# Patient Record
Sex: FEMALE | Race: WHITE | NOT HISPANIC OR LATINO
[De-identification: names, ages, dates, MRNs, and addresses within clinical notes are randomized per-mention and may not be internally consistent; named-entity substitution may affect disease eponyms.]

---

## 2020-01-23 ENCOUNTER — ASOB RESULT (OUTPATIENT)
Age: 20
End: 2020-01-23

## 2020-01-23 ENCOUNTER — APPOINTMENT (OUTPATIENT)
Dept: ANTEPARTUM | Facility: CLINIC | Age: 20
End: 2020-01-23
Payer: MEDICAID

## 2020-01-23 PROCEDURE — 76811 OB US DETAILED SNGL FETUS: CPT

## 2020-01-23 PROCEDURE — 76817 TRANSVAGINAL US OBSTETRIC: CPT

## 2020-06-07 ENCOUNTER — INPATIENT (INPATIENT)
Facility: HOSPITAL | Age: 20
LOS: 2 days | Discharge: HOME | End: 2020-06-10
Attending: OBSTETRICS & GYNECOLOGY | Admitting: OBSTETRICS & GYNECOLOGY
Payer: MEDICAID

## 2020-06-07 VITALS — DIASTOLIC BLOOD PRESSURE: 80 MMHG | SYSTOLIC BLOOD PRESSURE: 133 MMHG | HEART RATE: 109 BPM

## 2020-06-07 LAB
APPEARANCE UR: CLEAR — SIGNIFICANT CHANGE UP
BASOPHILS # BLD AUTO: 0.03 K/UL — SIGNIFICANT CHANGE UP (ref 0–0.2)
BASOPHILS NFR BLD AUTO: 0.2 % — SIGNIFICANT CHANGE UP (ref 0–1)
BILIRUB UR-MCNC: NEGATIVE — SIGNIFICANT CHANGE UP
BLD GP AB SCN SERPL QL: SIGNIFICANT CHANGE UP
COLOR SPEC: SIGNIFICANT CHANGE UP
DIFF PNL FLD: NEGATIVE — SIGNIFICANT CHANGE UP
EOSINOPHIL # BLD AUTO: 0.04 K/UL — SIGNIFICANT CHANGE UP (ref 0–0.7)
EOSINOPHIL NFR BLD AUTO: 0.3 % — SIGNIFICANT CHANGE UP (ref 0–8)
GLUCOSE UR QL: NEGATIVE — SIGNIFICANT CHANGE UP
HCT VFR BLD CALC: 39.2 % — SIGNIFICANT CHANGE UP (ref 37–47)
HGB BLD-MCNC: 13.1 G/DL — SIGNIFICANT CHANGE UP (ref 12–16)
IMM GRANULOCYTES NFR BLD AUTO: 0.7 % — HIGH (ref 0.1–0.3)
KETONES UR-MCNC: NEGATIVE — SIGNIFICANT CHANGE UP
LEUKOCYTE ESTERASE UR-ACNC: NEGATIVE — SIGNIFICANT CHANGE UP
LYMPHOCYTES # BLD AUTO: 18.7 % — LOW (ref 20.5–51.1)
LYMPHOCYTES # BLD AUTO: 2.84 K/UL — SIGNIFICANT CHANGE UP (ref 1.2–3.4)
MCHC RBC-ENTMCNC: 30.2 PG — SIGNIFICANT CHANGE UP (ref 27–31)
MCHC RBC-ENTMCNC: 33.4 G/DL — SIGNIFICANT CHANGE UP (ref 32–37)
MCV RBC AUTO: 90.3 FL — SIGNIFICANT CHANGE UP (ref 81–99)
MONOCYTES # BLD AUTO: 0.88 K/UL — HIGH (ref 0.1–0.6)
MONOCYTES NFR BLD AUTO: 5.8 % — SIGNIFICANT CHANGE UP (ref 1.7–9.3)
NEUTROPHILS # BLD AUTO: 11.3 K/UL — HIGH (ref 1.4–6.5)
NEUTROPHILS NFR BLD AUTO: 74.3 % — SIGNIFICANT CHANGE UP (ref 42.2–75.2)
NITRITE UR-MCNC: NEGATIVE — SIGNIFICANT CHANGE UP
NRBC # BLD: 0 /100 WBCS — SIGNIFICANT CHANGE UP (ref 0–0)
PH UR: 7 — SIGNIFICANT CHANGE UP (ref 5–8)
PLATELET # BLD AUTO: 216 K/UL — SIGNIFICANT CHANGE UP (ref 130–400)
PRENATAL SYPHILIS TEST: SIGNIFICANT CHANGE UP
PROT UR-MCNC: NEGATIVE — SIGNIFICANT CHANGE UP
RBC # BLD: 4.34 M/UL — SIGNIFICANT CHANGE UP (ref 4.2–5.4)
RBC # FLD: 13.3 % — SIGNIFICANT CHANGE UP (ref 11.5–14.5)
SARS-COV-2 RNA SPEC QL NAA+PROBE: SIGNIFICANT CHANGE UP
SP GR SPEC: 1.01 — SIGNIFICANT CHANGE UP (ref 1.01–1.02)
UROBILINOGEN FLD QL: SIGNIFICANT CHANGE UP
WBC # BLD: 15.19 K/UL — HIGH (ref 4.8–10.8)
WBC # FLD AUTO: 15.19 K/UL — HIGH (ref 4.8–10.8)

## 2020-06-07 RX ORDER — DINOPROSTONE 10 MG/241MG
10 INSERT VAGINAL ONCE
Refills: 0 | Status: COMPLETED | OUTPATIENT
Start: 2020-06-07 | End: 2020-06-07

## 2020-06-07 RX ORDER — OXYTOCIN 10 UNIT/ML
41.67 VIAL (ML) INJECTION
Qty: 20 | Refills: 0 | Status: DISCONTINUED | OUTPATIENT
Start: 2020-06-07 | End: 2020-06-10

## 2020-06-07 RX ADMIN — DINOPROSTONE 10 MILLIGRAM(S): 10 INSERT VAGINAL at 21:30

## 2020-06-07 NOTE — OB PROVIDER H&P - NSHPPHYSICALEXAM_GEN_ALL_CORE
Vital Signs Last 24 Hrs  T(F): 97.5 (07 Jun 2020 20:13), Max: 97.52 (07 Jun 2020 20:12)  HR: 109 (07 Jun 2020 20:13) (109 - 109)  BP: 133/80 (07 Jun 2020 20:13) (133/80 - 133/80)  RR: 18 (07 Jun 2020 20:13) (18 - 18)    Physical Exam:  GA: AOX3, no apparent distress  Resp: CTAB  Cardio: RRR  Abd: soft, nontender, no palpable ctx, gravid  Extr: no erythema or pain to palpation bilaterally   SVE: 1/50/-3, vtx, intact     EFM: 130bpm/moderate variability/+accels   [employer]: irregular

## 2020-06-07 NOTE — OB PROVIDER H&P - ASSESSMENT
A/P: 19yo  at 41w6d GA, Rh pos, GBS negative; IOL for post-EDC   -admit to labor and delivery  -pain management prn   -continous efm & toco  -admission labs  -IV hydration   -induction: cervidil       Dr. Pretty & Dr. Garnica aware

## 2020-06-07 NOTE — OB PROVIDER H&P - HISTORY OF PRESENT ILLNESS
19yo  at 41w6d GA dated by LMP presents to L&D for IOL for post-dates. Reports good fetal movement. Denies VB, LOF, or contractions. Denies complications in this pregnancy. GBS negative

## 2020-06-07 NOTE — OB PROVIDER H&P - NSHPLABSRESULTS_GEN_ALL_CORE
Labs:  HbSAg: neg  HIV: neg   RPR: neg   Blood Type: AB pos   Antibody Screen: neg  Rubella: immune  Varicella: immune  Measles: immune  GCT: 75  GBS: neg       Sonos:  @23w0d: AGA, 17%, nml anatomy, nl marychuy, 3 VC, posterior placenta

## 2020-06-08 LAB
AMPHET UR-MCNC: NEGATIVE — SIGNIFICANT CHANGE UP
BARBITURATES UR SCN-MCNC: NEGATIVE — SIGNIFICANT CHANGE UP
BENZODIAZ UR-MCNC: NEGATIVE — SIGNIFICANT CHANGE UP
BUPRENORPHINE SCREEN, URINE RESULT: NEGATIVE — SIGNIFICANT CHANGE UP
COCAINE METAB.OTHER UR-MCNC: NEGATIVE — SIGNIFICANT CHANGE UP
FENTANYL UR QL: NEGATIVE — SIGNIFICANT CHANGE UP
L&D DRUG SCREEN, URINE: SIGNIFICANT CHANGE UP
METHADONE UR-MCNC: NEGATIVE — SIGNIFICANT CHANGE UP
OPIATES UR-MCNC: NEGATIVE — SIGNIFICANT CHANGE UP
OXYCODONE UR-MCNC: NEGATIVE — SIGNIFICANT CHANGE UP
PCP UR-MCNC: NEGATIVE — SIGNIFICANT CHANGE UP
PROPOXYPHENE QUALITATIVE URINE RESULT: NEGATIVE — SIGNIFICANT CHANGE UP

## 2020-06-08 PROCEDURE — 59400 OBSTETRICAL CARE: CPT | Mod: U9

## 2020-06-08 RX ORDER — KETOROLAC TROMETHAMINE 30 MG/ML
30 SYRINGE (ML) INJECTION ONCE
Refills: 0 | Status: DISCONTINUED | OUTPATIENT
Start: 2020-06-08 | End: 2020-06-08

## 2020-06-08 RX ORDER — AER TRAVELER 0.5 G/1
1 SOLUTION RECTAL; TOPICAL EVERY 4 HOURS
Refills: 0 | Status: DISCONTINUED | OUTPATIENT
Start: 2020-06-08 | End: 2020-06-10

## 2020-06-08 RX ORDER — MAGNESIUM HYDROXIDE 400 MG/1
30 TABLET, CHEWABLE ORAL
Refills: 0 | Status: DISCONTINUED | OUTPATIENT
Start: 2020-06-08 | End: 2020-06-10

## 2020-06-08 RX ORDER — IBUPROFEN 200 MG
600 TABLET ORAL EVERY 6 HOURS
Refills: 0 | Status: COMPLETED | OUTPATIENT
Start: 2020-06-08 | End: 2021-05-07

## 2020-06-08 RX ORDER — ONDANSETRON 8 MG/1
4 TABLET, FILM COATED ORAL EVERY 6 HOURS
Refills: 0 | Status: DISCONTINUED | OUTPATIENT
Start: 2020-06-08 | End: 2020-06-10

## 2020-06-08 RX ORDER — PRAMOXINE HYDROCHLORIDE 150 MG/15G
1 AEROSOL, FOAM RECTAL EVERY 4 HOURS
Refills: 0 | Status: DISCONTINUED | OUTPATIENT
Start: 2020-06-08 | End: 2020-06-10

## 2020-06-08 RX ORDER — OXYTOCIN 10 UNIT/ML
2 VIAL (ML) INJECTION
Qty: 30 | Refills: 0 | Status: DISCONTINUED | OUTPATIENT
Start: 2020-06-08 | End: 2020-06-08

## 2020-06-08 RX ORDER — ACETAMINOPHEN 500 MG
975 TABLET ORAL
Refills: 0 | Status: DISCONTINUED | OUTPATIENT
Start: 2020-06-08 | End: 2020-06-10

## 2020-06-08 RX ORDER — OXYCODONE HYDROCHLORIDE 5 MG/1
5 TABLET ORAL
Refills: 0 | Status: DISCONTINUED | OUTPATIENT
Start: 2020-06-08 | End: 2020-06-10

## 2020-06-08 RX ORDER — DIBUCAINE 1 %
1 OINTMENT (GRAM) RECTAL EVERY 6 HOURS
Refills: 0 | Status: DISCONTINUED | OUTPATIENT
Start: 2020-06-08 | End: 2020-06-10

## 2020-06-08 RX ORDER — SODIUM CHLORIDE 9 MG/ML
3 INJECTION INTRAMUSCULAR; INTRAVENOUS; SUBCUTANEOUS EVERY 8 HOURS
Refills: 0 | Status: DISCONTINUED | OUTPATIENT
Start: 2020-06-08 | End: 2020-06-09

## 2020-06-08 RX ORDER — IBUPROFEN 200 MG
600 TABLET ORAL EVERY 6 HOURS
Refills: 0 | Status: DISCONTINUED | OUTPATIENT
Start: 2020-06-08 | End: 2020-06-10

## 2020-06-08 RX ORDER — SENNA PLUS 8.6 MG/1
2 TABLET ORAL AT BEDTIME
Refills: 0 | Status: DISCONTINUED | OUTPATIENT
Start: 2020-06-08 | End: 2020-06-10

## 2020-06-08 RX ORDER — NALOXONE HYDROCHLORIDE 4 MG/.1ML
0.1 SPRAY NASAL
Refills: 0 | Status: DISCONTINUED | OUTPATIENT
Start: 2020-06-08 | End: 2020-06-10

## 2020-06-08 RX ORDER — LANOLIN
1 OINTMENT (GRAM) TOPICAL EVERY 6 HOURS
Refills: 0 | Status: DISCONTINUED | OUTPATIENT
Start: 2020-06-08 | End: 2020-06-10

## 2020-06-08 RX ORDER — DEXAMETHASONE 0.5 MG/5ML
4 ELIXIR ORAL EVERY 6 HOURS
Refills: 0 | Status: DISCONTINUED | OUTPATIENT
Start: 2020-06-08 | End: 2020-06-10

## 2020-06-08 RX ORDER — DIPHENHYDRAMINE HCL 50 MG
25 CAPSULE ORAL EVERY 6 HOURS
Refills: 0 | Status: DISCONTINUED | OUTPATIENT
Start: 2020-06-08 | End: 2020-06-10

## 2020-06-08 RX ORDER — BUTORPHANOL TARTRATE 2 MG/ML
2 INJECTION, SOLUTION INTRAMUSCULAR; INTRAVENOUS ONCE
Refills: 0 | Status: DISCONTINUED | OUTPATIENT
Start: 2020-06-08 | End: 2020-06-08

## 2020-06-08 RX ORDER — SIMETHICONE 80 MG/1
80 TABLET, CHEWABLE ORAL EVERY 4 HOURS
Refills: 0 | Status: DISCONTINUED | OUTPATIENT
Start: 2020-06-08 | End: 2020-06-10

## 2020-06-08 RX ORDER — BENZOCAINE 10 %
1 GEL (GRAM) MUCOUS MEMBRANE EVERY 6 HOURS
Refills: 0 | Status: DISCONTINUED | OUTPATIENT
Start: 2020-06-08 | End: 2020-06-10

## 2020-06-08 RX ORDER — BUPIVACAINE HCL/PF 7.5 MG/ML
250 VIAL (ML) INJECTION
Refills: 0 | Status: DISCONTINUED | OUTPATIENT
Start: 2020-06-08 | End: 2020-06-10

## 2020-06-08 RX ORDER — ONDANSETRON 8 MG/1
4 TABLET, FILM COATED ORAL ONCE
Refills: 0 | Status: COMPLETED | OUTPATIENT
Start: 2020-06-08 | End: 2020-06-08

## 2020-06-08 RX ORDER — HYDROCORTISONE 1 %
1 OINTMENT (GRAM) TOPICAL EVERY 6 HOURS
Refills: 0 | Status: DISCONTINUED | OUTPATIENT
Start: 2020-06-08 | End: 2020-06-10

## 2020-06-08 RX ORDER — OXYCODONE HYDROCHLORIDE 5 MG/1
5 TABLET ORAL ONCE
Refills: 0 | Status: DISCONTINUED | OUTPATIENT
Start: 2020-06-08 | End: 2020-06-10

## 2020-06-08 RX ADMIN — Medication 2 MILLIUNIT(S)/MIN: at 03:54

## 2020-06-08 RX ADMIN — Medication 1 SPRAY(S): at 22:35

## 2020-06-08 RX ADMIN — Medication 600 MILLIGRAM(S): at 18:02

## 2020-06-08 RX ADMIN — BUTORPHANOL TARTRATE 2 MILLIGRAM(S): 2 INJECTION, SOLUTION INTRAMUSCULAR; INTRAVENOUS at 01:12

## 2020-06-08 RX ADMIN — SENNA PLUS 2 TABLET(S): 8.6 TABLET ORAL at 22:33

## 2020-06-08 RX ADMIN — Medication 975 MILLIGRAM(S): at 22:15

## 2020-06-08 RX ADMIN — ONDANSETRON 4 MILLIGRAM(S): 8 TABLET, FILM COATED ORAL at 01:12

## 2020-06-08 RX ADMIN — SODIUM CHLORIDE 3 MILLILITER(S): 9 INJECTION INTRAMUSCULAR; INTRAVENOUS; SUBCUTANEOUS at 16:22

## 2020-06-08 RX ADMIN — Medication 975 MILLIGRAM(S): at 16:58

## 2020-06-08 RX ADMIN — AER TRAVELER 1 APPLICATION(S): 0.5 SOLUTION RECTAL; TOPICAL at 17:01

## 2020-06-08 RX ADMIN — Medication 30 MILLIGRAM(S): at 13:21

## 2020-06-08 NOTE — OB PROVIDER DELIVERY SUMMARY - NSPROVIDERDELIVERYNOTE_OBGYN_ALL_OB_FT
Patient was fully dilated and pushing. Fetal head was OA and restituted to ROT. The anterior and posterior shoulders delivered, followed by the remaining body atraumatically. Delayed cord clamping was performed, and then clamped and cut. Cord blood gases collected x2. The  was handed to the mother. The placenta delivered intact with membranes. Pitocin was administered. Uterus massaged, fundus found to be firm. Cervix, vagina and perineum inspected. Second degree laceration was noted, repaired using 2-0 chromic in the usual fashion with good hemostasis.     Viable female infant delivered,  with APGARs 9/9    Lacteration: 2nd degree laceration  EBL 300cc

## 2020-06-08 NOTE — PROGRESS NOTE ADULT - ASSESSMENT
A/P:  21yo  at 42w0d GA, Rh pos, GBS negative, s/p cervidil, IOL for post-EDC, s/p epidural, s/p AROM clear  -fully dilated and pushing  -cont EFM/toco  -clear liquid diet, IVF  -pain management with epidural  -f/u UDS    Dr. Garnica and Dr. Bronson aware.

## 2020-06-08 NOTE — PROGRESS NOTE ADULT - SUBJECTIVE AND OBJECTIVE BOX
PGY 1 Note    Patient seen at bedside for evaluation of labor progression. Comfortable s/p epidural.  Instructed on pushing and currently pushing. S/p AROM clear @0648.     Vital Signs Last 24 Hrs  T(C): 36.6 (2020 04:46), Max: 36.6 (2020 01:13)  T(F): 97.88 (2020 01:13), Max: 97.88 (2020 01:13)  HR: 87 (2020 10:51) (71 - 127)  BP: 108/60 (2020 10:51) (98/57 - 150/83) first elevated  0100  RR: 20 (2020 04:46) (18 - 20)      EFM: 140/mod/no accel/intermittent decels with contractions  TOCO: q2-3m  SVE: 10/100/+1 @0942    Labs:                        13.1   15.19 )-----------( 216      ( 2020 21:09 )             39.2           ABO RH Interpretation: AB POS (20 @ 22:40)  antibody neg  COVID neg    Urinalysis Basic - ( 2020 21:09 )    Color: Light Yellow / Appearance: Clear / S.014 / pH: x  Gluc: x / Ketone: Negative  / Bili: Negative / Urobili: <2 mg/dL   Blood: x / Protein: Negative / Nitrite: Negative   Leuk Esterase: Negative / RBC: x / WBC x   Sq Epi: x / Non Sq Epi: x / Bacteria: x      Meds: BUpivacaine 0.1% in 0.9% Sodium Chloride PCEA 250 milliLiter(s) Epidural PCA Continuous, started @0530  oxytocin Infusion 2 milliUNIT(s)/Min IV Continuous <Continuous>, currently off  butorphanol Injectable 2 milliGRAM(s) IV Push once  dinoprostone Insert 10 milliGRAM(s) Vaginal once  ondansetron Injectable 4 milliGRAM(s) IV Push once

## 2020-06-08 NOTE — PROGRESS NOTE ADULT - SUBJECTIVE AND OBJECTIVE BOX
PGY1 Note    Patient seen at bedside for evaluation of contraction pain and complaints of LOF. She would like stadol for pain management at this time.     T(F): 97.5 (20:13)  HR: 82 (00:30)  BP: 150/83 (01:00)  RR: 18 (20:13)    EFM: 135/mod armen/+accels  TOCO: q1-2 mins  SVE: 1/50/-3, vtx, intact    Medications:  dinoprostone Insert: 10 milliGRAM(s) (20 @ 21:30)      Labs:                        13.1   15.19 )-----------( 216      ( 2020 21:09 )             39.2           ABO RH Interpretation: AB POS (20 @ 22:40)    Antibody Screen: NEG (20 @ 22:40)    Urinalysis Basic - ( 2020 21:09 )    Color: Light Yellow / Appearance: Clear / S.014 / pH: x  Gluc: x / Ketone: Negative  / Bili: Negative / Urobili: <2 mg/dL   Blood: x / Protein: Negative / Nitrite: Negative   Leuk Esterase: Negative / RBC: x / WBC x   Sq Epi: x / Non Sq Epi: x / Bacteria: x        Prenatal Syphilis Test: Nonreact (20 @ 21:09)                A/P: 21yo  at 42w0d GA, Rh pos, GBS negative; IOL for post-EDC, with cervidil   -continue cervidil until 930  -pain management prn - Stadol and Zofran ordered  -cont efm/toco  -f/u pending labs (UDS)  -cont to monitor vitals  -cont iv hydration    Dr. Pretty and Dr. Garnica aware

## 2020-06-08 NOTE — PROGRESS NOTE ADULT - SUBJECTIVE AND OBJECTIVE BOX
PGY1 Note    Patient seen at bedside after cervidil fell out. Pt reports no acute complaints.    T(F): 97.88 ( @ 01:13), Max: 97.88 ( @ 01:13)  HR: 89 ( @ 02:00)  BP: 136/69 ( @ 02:00) (115/67 - 150/83)  RR: 20 ( @ 01:13)    EFM: 120bpm/moderate variability/+accels  TOCO: q2-4mins  SVE: 1-2/70/-3    Medications:  butorphanol Injectable: 2 ( @ 01:12)  dinoprostone Insert: 10 ( @ 21:30)  ondansetron Injectable: 4 ( @ 01:12)      Labs:                        13.1   15.19 )-----------( 216      ( 2020 21:09 )             39.2           Prenatal Syphilis Test: Nonreact ( @ 21:09)  Antibody Screen: NEG (20 @ 22:40)    Urinalysis Basic - ( 2020 21:09 )    Color: Light Yellow / Appearance: Clear / S.014 / pH: x  Gluc: x / Ketone: Negative  / Bili: Negative / Urobili: <2 mg/dL   Blood: x / Protein: Negative / Nitrite: Negative   Leuk Esterase: Negative / RBC: x / WBC x   Sq Epi: x / Non Sq Epi: x / Bacteria: x

## 2020-06-08 NOTE — PROGRESS NOTE ADULT - ASSESSMENT
A/P: 19yo  at 42w0d GA, Rh pos, GBS negative; s/p cervidil, IOL for post-EDC  -re-examine in 2 hours to evaluate for cervical change   -pain management prn  -cont efm/toco  -f/u pending labs (UDS)  -covid-19 neg   -cont to monitor vitals  -cont iv hydration  -diet: CLD     Dr. Pretty and Dr. Garnica aware

## 2020-06-09 LAB
BASOPHILS # BLD AUTO: 0.04 K/UL — SIGNIFICANT CHANGE UP (ref 0–0.2)
BASOPHILS NFR BLD AUTO: 0.3 % — SIGNIFICANT CHANGE UP (ref 0–1)
EOSINOPHIL # BLD AUTO: 0.11 K/UL — SIGNIFICANT CHANGE UP (ref 0–0.7)
EOSINOPHIL NFR BLD AUTO: 0.8 % — SIGNIFICANT CHANGE UP (ref 0–8)
HCT VFR BLD CALC: 28.7 % — LOW (ref 37–47)
HGB BLD-MCNC: 9.8 G/DL — LOW (ref 12–16)
IMM GRANULOCYTES NFR BLD AUTO: 0.6 % — HIGH (ref 0.1–0.3)
LYMPHOCYTES # BLD AUTO: 19.4 % — LOW (ref 20.5–51.1)
LYMPHOCYTES # BLD AUTO: 2.8 K/UL — SIGNIFICANT CHANGE UP (ref 1.2–3.4)
MCHC RBC-ENTMCNC: 31.1 PG — HIGH (ref 27–31)
MCHC RBC-ENTMCNC: 34.1 G/DL — SIGNIFICANT CHANGE UP (ref 32–37)
MCV RBC AUTO: 91.1 FL — SIGNIFICANT CHANGE UP (ref 81–99)
MONOCYTES # BLD AUTO: 0.86 K/UL — HIGH (ref 0.1–0.6)
MONOCYTES NFR BLD AUTO: 6 % — SIGNIFICANT CHANGE UP (ref 1.7–9.3)
NEUTROPHILS # BLD AUTO: 10.53 K/UL — HIGH (ref 1.4–6.5)
NEUTROPHILS NFR BLD AUTO: 72.9 % — SIGNIFICANT CHANGE UP (ref 42.2–75.2)
NRBC # BLD: 0 /100 WBCS — SIGNIFICANT CHANGE UP (ref 0–0)
PLATELET # BLD AUTO: 179 K/UL — SIGNIFICANT CHANGE UP (ref 130–400)
RBC # BLD: 3.15 M/UL — LOW (ref 4.2–5.4)
RBC # FLD: 13.6 % — SIGNIFICANT CHANGE UP (ref 11.5–14.5)
SARS-COV-2 IGG SERPL QL IA: NEGATIVE — SIGNIFICANT CHANGE UP
SARS-COV-2 IGM SERPL IA-ACNC: <0.1 INDEX — SIGNIFICANT CHANGE UP
WBC # BLD: 14.43 K/UL — HIGH (ref 4.8–10.8)
WBC # FLD AUTO: 14.43 K/UL — HIGH (ref 4.8–10.8)

## 2020-06-09 RX ADMIN — Medication 975 MILLIGRAM(S): at 22:48

## 2020-06-09 RX ADMIN — Medication 600 MILLIGRAM(S): at 00:48

## 2020-06-09 RX ADMIN — Medication 975 MILLIGRAM(S): at 08:52

## 2020-06-09 RX ADMIN — Medication 975 MILLIGRAM(S): at 03:01

## 2020-06-09 RX ADMIN — Medication 600 MILLIGRAM(S): at 11:58

## 2020-06-09 RX ADMIN — Medication 600 MILLIGRAM(S): at 06:43

## 2020-06-09 RX ADMIN — SENNA PLUS 2 TABLET(S): 8.6 TABLET ORAL at 21:45

## 2020-06-09 NOTE — PROGRESS NOTE ADULT - SUBJECTIVE AND OBJECTIVE BOX
OB attending  PPD #1    Pt doing well, pain well controlled. No overnight events, no acute complaints.    Ambulating: Yes  Voiding: Yes  Flatus: Yes  Bowel movements: Yes   Breast or bottle feeding: Breastfeeding  Diet: Regular    PAST MEDICAL & SURGICAL HISTORY:  No pertinent past medical history  No significant past surgical history      Physical Exam  Vital Signs Last 24 Hrs  T(C): 35.8 (2020 07:44), Max: 36.8 (2020 16:17)  T(F): 96.4 (2020 07:44), Max: 98.3 (2020 16:17)  HR: 76 (2020 07:44) (75 - 98)  BP: 115/57 (2020 07:44) (111/56 - 128/61)  BP(mean): --  RR: 18 (2020 07:44) (18 - 18)  SpO2: --  Gen: AAOx3, NAD  Abd: Soft, nontender, nondistended, BS+  Fundus: Firm, below umbilicus  Lochia: normal  Ext: No calf tenderness, no swelling    Labs:                        9.8    14.43 )-----------( 179      ( 2020 06:28 )             28.7     rh+    A/P:  s/p , PPD #1, doing well  - continue current management  - d/c home in [place]

## 2020-06-10 ENCOUNTER — TRANSCRIPTION ENCOUNTER (OUTPATIENT)
Age: 20
End: 2020-06-10

## 2020-06-10 VITALS
RESPIRATION RATE: 18 BRPM | SYSTOLIC BLOOD PRESSURE: 115 MMHG | HEART RATE: 80 BPM | TEMPERATURE: 97 F | DIASTOLIC BLOOD PRESSURE: 58 MMHG

## 2020-06-10 RX ORDER — IBUPROFEN 200 MG
1 TABLET ORAL
Qty: 120 | Refills: 0
Start: 2020-06-10 | End: 2020-07-09

## 2020-06-10 RX ORDER — SIMETHICONE 80 MG/1
1 TABLET, CHEWABLE ORAL
Qty: 0 | Refills: 0 | DISCHARGE
Start: 2020-06-10

## 2020-06-10 RX ORDER — ACETAMINOPHEN 500 MG
2 TABLET ORAL
Qty: 0 | Refills: 0 | DISCHARGE
Start: 2020-06-10

## 2020-06-10 RX ORDER — DOCUSATE SODIUM 100 MG
1 CAPSULE ORAL
Qty: 20 | Refills: 0
Start: 2020-06-10 | End: 2020-06-19

## 2020-06-10 RX ORDER — ACETAMINOPHEN 500 MG
3 TABLET ORAL
Qty: 0 | Refills: 0 | DISCHARGE
Start: 2020-06-10

## 2020-06-10 RX ORDER — IBUPROFEN 200 MG
1 TABLET ORAL
Qty: 0 | Refills: 0 | DISCHARGE
Start: 2020-06-10

## 2020-06-10 RX ADMIN — Medication 975 MILLIGRAM(S): at 08:35

## 2020-06-10 RX ADMIN — Medication 975 MILLIGRAM(S): at 14:47

## 2020-06-10 RX ADMIN — Medication 600 MILLIGRAM(S): at 06:04

## 2020-06-10 RX ADMIN — Medication 975 MILLIGRAM(S): at 02:58

## 2020-06-10 RX ADMIN — Medication 600 MILLIGRAM(S): at 12:34

## 2020-06-10 NOTE — PROGRESS NOTE ADULT - SUBJECTIVE AND OBJECTIVE BOX
OB attending  PPD #2    Pt doing well, pain well controlled. No overnight events, no acute complaints.    Ambulating: Yes  Voiding: Yes  Flatus: Yes  Bowel movements: Yes   Breast or bottle feeding: Breastfeeding  Diet: Regular    PAST MEDICAL & SURGICAL HISTORY:  No pertinent past medical history  No significant past surgical history      Physical Exam  Vital Signs Last 24 Hrs  T(C): 36.1 (10 Marcel 2020 07:24), Max: 36.1 (10 Marcel 2020 07:24)  T(F): 97 (10 Marcel 2020 07:24), Max: 97 (10 Marcel 2020 07:24)  HR: 80 (10 Marcel 2020 07:24) (73 - 80)  BP: 115/58 (10 Marcel 2020 07:24) (113/62 - 119/63)  BP(mean): --  RR: 18 (10 Marcel 2020 07:24) (18 - 18)  SpO2: --  Gen: AAOx3, NAD  Abd: Soft, nontender, nondistended, BS+  Fundus: Firm, below umbilicus  Lochia: normal  Ext: No calf tenderness, no swelling    Labs:                        9.8    14.43 )-----------( 179      ( 2020 06:28 )             28.7         A/P:  s/p , PPD #1, doing well  - continue current management  d/c home

## 2020-06-10 NOTE — DISCHARGE NOTE OB - PATIENT PORTAL LINK FT
You can access the FollowMyHealth Patient Portal offered by [facility] by registering at the following website: [email or web address]. By joining [facility] FollowMyHealth portal, you will also be able to view your health information using other applications (apps) compatible with our system.

## 2020-06-10 NOTE — DISCHARGE NOTE OB - PHYSICIAN SECTION COMPLETE
consult dict awake alert speech fluent numbness arm leg resolved arm leg 4/5  tia for work up non focla exam Yes

## 2020-06-10 NOTE — DISCHARGE NOTE OB - CARE PROVIDER_API CALL
Jhonny Diaz  OBSTETRICS AND GYNECOLOGY  [address] [place] [address]  Phone: [phone]  Fax: [phone]  Follow Up Time:

## 2020-06-12 DIAGNOSIS — Z3A.41 41 WEEKS GESTATION OF PREGNANCY: ICD-10-CM

## 2020-06-12 DIAGNOSIS — Z34.80 ENCOUNTER FOR SUPERVISION OF OTHER NORMAL PREGNANCY, UNSPECIFIED TRIMESTER: ICD-10-CM

## 2020-06-12 DIAGNOSIS — O48.0 POST-TERM PREGNANCY: ICD-10-CM

## 2020-07-06 NOTE — OB PROVIDER H&P - NSPRENATALGBS_OBGYN_ALL_OB_GET_DAYS
[address] Neurology A department of [place] [address]  Phone: [phone]  Fax: [phone]    Zuleika Chakraborty MD        July 6, 2020     Patient: Jorje Dumont   YOB: 1981   Date of Visit: 7/6/2020       To Whom it May Concern:    Abdi Sharif was seen in my clinic on 7/6/2020. She may return to work on 07/06/2020. If you have any questions or concerns, please don't hesitate to call.     Sincerely,         Zuleika Chakraborty MD [phone]

## 2020-07-14 ENCOUNTER — FORM ENCOUNTER (OUTPATIENT)
Age: 20
End: 2020-07-14

## 2020-09-05 NOTE — OB PROVIDER IHI INDUCTION/AUGMENTATION NOTE - NS_FINALEDD_OBGYN_ALL_OB_DT
Progress Note - Sonam Koenig 5/36/2061, 76 y o  female MRN: [id number]    Unit/Bed#: [facility] 525-01 Encounter: [id number]    Primary Care Provider: Ivory Gabriel DO   Date and time admitted to hospital: 8/26/2020  4:18 PM        Coronary artery disease  Assessment & Plan  S/p PCI July 2019  C/w Plavix and BB and statin    LLQ pain  Assessment & Plan  c/o severe pain  CTAP showed no acute finding  Incidentally shows transverse colon nodule  Can get repeat CTAP in 3 months if desired by guardian   Suspect musculoskeletal pain    Herpes zoster without complication  Assessment & Plan  In lumbar region  Lesions appear crusted - no need for contact precautions  Completed Valtrex for 7 days    CKD (chronic kidney disease) stage 3, GFR 30-59 ml/min ([facility])  Assessment & Plan  Estimated Creatinine Clearance: 50 3 mL/min (by C-G formula based on SCr of 0 96 mg/dL)  B/l Cr 1 1  See MALA    DM2 (diabetes mellitus, type 2) [facility])  Assessment & Plan  Lab Results   Component Value Date    HGBA1C 7 6 (H) 08/05/2020       Recent Labs     09/04/20  1616 09/04/20 2052 09/05/20  0647 09/05/20  1044   POCGLU 185* 163* 134 132       Blood Sugar Average: Last 72 hrs:  (P) 147 [id number]   SSI  Blood Glucose checks TIDWM and QHS  Hold oral medications  ISS      Pain of cervical spine  Assessment & Plan  Scheduled tylenol  Neurontin changed to qhs  Oxy prn  MRI shows severe stenosis  Pt was supposed to see nsurg OP on 8/24 but did not show up to appt  Six vw Cervical XR today shows multilevel degenerative disc disease status post fusion with stable grade 1-2 anterolisthesis of C3-4  Neurosurg consult appreciated - no plan for surgery at this time  Palliative consult for pain control appreciated - start on scheduled oxy - increased to 7 5mg q6 by palliative care on 9/4  prob has some pain at her zoster healing site as well   Will increase neurontin from 200 HS to add 100 mg QAM & 200 qPM on 9/5  Add ambien for sleep    A-fib [facility])  Assessment & Plan  Patient rate controlled on admission; continue BB  Anticoagulated with Eliquis 5 mg b i d  Anemia  Assessment & Plan  · Hb dropped from 11-12 to 9  · Check stool occult, iron panel  · Patients states she has intermittent black stools  · HB this morning appears stable at 9 5 from 9 0  · Continue eliquis  · CT AP had no evidence of bleed    Elevated troponin level not due myocardial infarction  Assessment & Plan  Patient with elevated troponin; denies chest pain  EKG in the ED negative for ischemia; shows rate controlled atrial fibrillation  Trop actually lower than earlier in month  No need for further w/u    Mild cognitive impairment  Assessment & Plan  Neurospsych eval pt 8/28 and deemed her incompetent to make medical decisions    Essential hypertension  Assessment & Plan  C/w BB  Hold ACEi in setting of MALA    Chronic systolic congestive heart failure (HCC)  Assessment & Plan  Wt Readings from Last 3 Encounters:   09/05/20 82 3 kg (181 lb 7 oz)   08/09/20 75 4 kg (166 lb 3 6 oz)   08/06/20 86 4 kg (190 lb 7 6 oz)     Echo with EF 45%; continue home medications  Patient reports dyspnea on exertion; became winded talking to examiner  BNP elevated at 1326; distended abdomen  Denies night cough, orthopnea, proximal nocturnal dyspnea; a chest x-ray read as normal, but may have infiltrates  at time of exam on 8/27 appears euvolemic  However, possible MYERS 2/2 deconditioning as pt appears dry on exam  gentle IVF to tx MALA & it improved  Acute CHF exacerbation is ruled out  Per renal hold ACEi at d/c  Can d/c on Lasix 20 mg PO daily  Can have BMP at rehab and if Cr stable, can be placed back on ACEi  Since she will be here pending placement, restarted her on Lasix 20 mg daily      Weakness/physical deconditioning  Assessment & Plan  PT/OT  Recent admission or patient declined follow on rehabilitation  Neuropsych eval ordered and pt not competent to make medical decisions    Will need guardian and then rehab placement    Dysthymic disorder  Assessment & Plan  Patient tearful and depressed on examination in ER; denies SI/HI, but did earlier in the ED  Continue Prozac and Pamelor  Behavior Health consult appreciated - no 1 to 1 needed        [facility] Internal Medicine Progress Note  Patient: Luigi Rush 76 y o  female   MRN: [id number]  PCP: Brennan Badillo DO  Unit/Bed#: [facility] 525-01 Encounter: [id number]  Date Of Visit: 20    Assessment:    Principal Problem:    MALA (acute kidney injury) ([facility] )  Active Problems:    Coronary artery disease    Dysthymic disorder    Weakness/physical deconditioning    Chronic systolic congestive heart failure ([facility] )    Essential hypertension    Hyperlipidemia    Mild cognitive impairment    Elevated troponin level not due myocardial infarction    Anemia    A-fib ([facility])    Pain of cervical spine    DM2 (diabetes mellitus, type 2) ([facility])    CKD (chronic kidney disease) stage 3, GFR 30-59 ml/min ([facility])    Herpes zoster without complication    LLQ pain      Plan:       VTE Pharmacologic Prophylaxis:   Pharmacologic: Apixaban (Eliquis)  Mechanical VTE Prophylaxis in Place: Yes    Patient Centered Rounds: I have performed bedside rounds with nursing staff today  Discussions with Specialists or Other Care Team Provider:     Education and Discussions with Family / Patient: ptient    Time Spent for Care: 30 minutes  More than 50% of total time spent on counseling and coordination of care as described above      Current Length of Stay: 10 day(s)    Current Patient Status: Inpatient   Certification Statement: The patient will continue to require additional inpatient hospital stay due to placement pending    Discharge Plan / Estimated Discharge Date:     Code Status: Level 3 - DNAR and DNI      Subjective:   Having pain, missing her granddaughter    Objective:     Vitals:   Temp (24hrs), Av 8 °F (36 6 °C), Min:97 5 °F (36 4 °C), Max:98 1 °F (36 7 °C)    Temp:  [97 5 °F (36 4 °C)-98 1 °F (36 7 °C)] 97 7 °F (36 5 °C)  HR:  [60-90] 60  Resp:  [16-18] 18  BP: (109-141)/(57-76) 109/63  SpO2:  [95 %-98 %] 95 %  Body mass index is 30 19 kg/m²  Input and Output Summary (last 24 hours): Intake/Output Summary (Last 24 hours) at 9/5/2020 1706  Last data filed at 9/5/2020 1654  Gross per 24 hour   Intake 768 ml   Output 700 ml   Net 68 ml       Physical Exam:     Physical Exam  Vitals signs reviewed  HENT:      Head: Normocephalic and atraumatic  Cardiovascular:      Rate and Rhythm: Normal rate and regular rhythm  Heart sounds: Normal heart sounds  No murmur  No gallop  Pulmonary:      Effort: Pulmonary effort is normal  No respiratory distress  Breath sounds: Normal breath sounds  No wheezing  Abdominal:      Palpations: Abdomen is soft  Neurological:      Mental Status: She is alert  Additional Data:     Labs:    Results from last 7 days   Lab Units 09/04/20  0440   WBC Thousand/uL 7 47   HEMOGLOBIN g/dL 8 7*   HEMATOCRIT % 28 1*   PLATELETS Thousands/uL 233   NEUTROS PCT % 75   LYMPHS PCT % 10*   MONOS PCT % 8   EOS PCT % 5     Results from last 7 days   Lab Units 09/04/20  0440   POTASSIUM mmol/L 4 3   CHLORIDE mmol/L 107   CO2 mmol/L 24   BUN mg/dL 16   CREATININE mg/dL 0 96   CALCIUM mg/dL 8 3           * I Have Reviewed All Lab Data Listed Above    * Additional Pertinent Lab Tests Reviewed: No New Labs Available For Today    Imaging:    Imaging Reports Reviewed Today Include:   Imaging Personally Reviewed by Myself Includes:      Recent Cultures (last 7 days):           Last 24 Hours Medication List:   Current Facility-Administered Medications   Medication Dose Route Frequency Provider Last Rate    acetaminophen  650 mg Oral 4x Daily (with meals and at bedtime) Denisse Bowling MD      amiodarone  200 mg Oral Daily Malena Silva MD      apixaban  5 mg Oral BID Malena Silva MD      atorvastatin  20 mg Oral Daily With Nasima Burt MD 25-May-2020  bisacodyl  10 mg Rectal Daily PRN Liam Bailey MD      clopidogrel  75 mg Oral Daily Liam Bailey MD      digoxin  125 mcg Oral Daily Liam Bailey MD      FLUoxetine  20 mg Oral Daily Liam Bailey MD      folic acid  7,079 mcg Oral Daily Liam Bailey MD      furosemide  20 mg Oral Daily Jeni Leroy MD      gabapentin  100 mg Oral Daily Jeni Leroy MD      gabapentin  200 mg Oral HS Alyse Martinez DO      insulin lispro  1-6 Units Subcutaneous TID [facility] Liam Bailey MD      insulin lispro  1-6 Units Subcutaneous HS Liam Bailey MD      melatonin  6 mg Oral HS Liam Bailey MD      methocarbamol  250 mg Oral BID PRN Jeni Leroy MD      metoprolol succinate  50 mg Oral Q12H Liam Bailey MD      nortriptyline  10 mg Oral HS Liam Bailey MD      oxyCODONE  5 mg Oral Q3H PRN Jimbo Cox DO      oxyCODONE  7 5 mg Oral 4x Daily (with meals and at bedtime) Rob Guaman MD      pantoprazole  40 mg Oral Early Morning Liam Bailey MD      senna-docusate sodium  1 tablet Oral HS Liam Bailey MD      zolpidem  5 mg Oral HS Jeni Leroy MD          Today, Patient Was Seen By: Jeni Leroy MD    ** Please Note: This note has been constructed using a voice recognition system   **

## 2020-09-16 NOTE — PROCEDURE NOTE - NS_ANESTHESIATYPE_OBGYN_ALL_OB
Occupational Therapy  Visit Type: treatment  Co-treat with: Physical therapist  Precautions:  Medical precautions:  fall risk; standard precautions.    Lines:     Basic: capped IV (Drain)    Complex Lines: hemovac drain.      Lines in chart and on patient reviewed, cautions maintained throughout session.  Lower Extremity:    Left:  weight bearing: as tolerated.    Right:  weight bearing: as tolerated.  Safety Measures: bed rails    SUBJECTIVE                                                                                                            Patient agreed to participate in therapy this date.    Patient is a 70 year old female admitted to [facility] for Rt TKA on 9/15. Pt lives with adult son (who works 12 hour days) in a one level home with tub/shower on main level. At baseline, patient is Independent with ADLs and Independent with functional mobility.    Patient / Family Goal: return to previous functional status and maximize function  Pain     Location: R knee    At onset of session (out of 10): 7  RN informed on pain level      OBJECTIVE                                                                                                                SpO2 97% on RA, SBP 130s  Level of consciousness: alert    Oriented to person, place and time     Arousal alertness: appropriate responses to stimuli    Affect/Behavior: alert and appropriate  Patient activity tolerance: 1 to 1 activity to rest  Functional Communication/Cognition    Overall status:  Within functional limits  Range of Motion (measured in degrees unless otherwise indicated)  WFL: LUE RUE  Strength (out of 5 unless otherwise indicated)  WFL: LUE, RUE  Balance  Sitting:    Static:  supervision     Dynamic:  supervision  Standing - Firm Surface - Eyes Open:     Static: contact guard/touching/steadying assist    Dynamic:  minimal assist and contact guard/touching/steadying assist    Edema:  Post-op swelling as expected. Discussed edema management techniques  with pt understanding.     Transfers:    Assistive devices: gait belt and 2-wheeled walker    Sit to stand: contact guard/touching/steadying assist    Stand to sit: contact guard/touching/steadying assist   Bed to chair: contact guard/touching/steadying assist, type:  Training completed:    Tasks: sit to stand, stand to sit, stand pivot and toilet    Education details: patient safety  Functional Ambulation:    Assistance:contact guard/touching/steadying assist   Assistive device:2-wheeled walker and gait belt    Distance (ft): 50    Surface: even  Activities of Daily Living (ADLs):  Lower Body Dressing:     Assist: moderate assist    Position: chair    Footwear assistance: maximal assist    Footwear position: chair    Assist needed for: don/doff left sock and don/doff right sock  Toilet transfer:     Assist: contact guard/touching/steadying assist    Device: gait belt  Toileting:     Assist: contact guard/touching/steadying assist and set up    Assist needed for: steadying and supervision/safety      Interventions                                                                                                                 Training provided: ADL training, activity tolerance, balance retraining, body mechanics, compensatory techniques, functional ambulation, safety training, stair training, transfer training and use of adaptive equipment  Skilled input: verbal instruction/cues    Education/instruction on: AE for use at home  Verbal Consent: Writer verbally educated and received verbal consent for hand placement, positioning of patient, and techniques to be performed today from patient         ASSESSMENT                                                                                                                Impairments: pain, range of motion, strength, activity tolerance and safety awareness (post-operative as expected)  Functional Limitations: IADLs, job performance and showering    Patient presents to  occupational therapy on DOS s/p right TKR. Education provided on safety with transfers and ADL re-training. Pt completed transfers with CGA, ambulated with walker, gait belt and CGA.  Pt noting significant pain with ambulation, completed toileting with set up, min A clothing management, CGA with ema hygiene.  Pt completed 1 step with min/CGA, decrease WB through R LE due to pain.  Pt up in chair following session, max A to complete don/doff socks.  Pt would benefit from rehab stay to increase strength, increase I with all ADLs, increase safety to return to PLOF.  Patient is demonstrating decreased strength, balance deficits, diminished safety awareness, decreased activity tolerance which is limiting the completion of all ADLs and all functional mobility.  Patient would benefit from an additional 1-2 sessions of skilled occupational therapy in order to address these limitations in attempt to maximize the patient's independence.    Discharge Recommendations  Recommendations for Discharge: OT WI: Home, Home therapy, 24 Hour assist            PT/OT Mobility Equipment for Discharge: Pt has 4ww (x2) and cane, but will need 2ww  PT/OT ADL Equipment for Discharge: shower chair  OT Identified Barriers to Discharge: fall risk, limited assist at d/c       Skilled therapy is required to address these limitations in attempt to maximize the patient's independence.  Progress: progressing toward goals  Pain at end of session:  6/10, location: R knee    End of Session:   Location: in chair  Safety measures: call light within reach, alarm system in place/re-engaged and lines intact  Handoff to: nurse    PLAN                                                                                                                          Suggestions for next session as indicated: LB dressing, toilet transfer/toileting, functional ADL ambulation, sinkside grooming, equipment recommendations (shower chair)    OT Frequency: 5 days/week          Interventions: ADL retraining, functional transfer training, IADL, use of adaptive equipment, bed mobility training, balance, patient education, edema management and compensatory technique education  Agreement to plan and goals: patient agrees with goals and treatment plan      Goals Reviewed: 9/22/2020  GOALS:  Long Term Goals: (to be met by time of discharge from hospital)  Grooming: Patient will complete grooming tasks in standing and at sink supervision.  Lower body dressing: Patient will complete lower body dressing supervision.  Toileting: Patient will complete toileting supervision.  Toilet transfer: Patient will complete toilet transfer with 2-wheeled walker, supervision.         Documented in the chart in the following areas: Pain. Assessment. Plan.       Epidural

## 2021-04-28 ENCOUNTER — FORM ENCOUNTER (OUTPATIENT)
Age: 21
End: 2021-04-28

## 2021-05-26 ENCOUNTER — FORM ENCOUNTER (OUTPATIENT)
Age: 21
End: 2021-05-26

## 2021-05-26 PROBLEM — Z78.9 OTHER SPECIFIED HEALTH STATUS: Chronic | Status: ACTIVE | Noted: 2020-06-07

## 2021-05-27 ENCOUNTER — APPOINTMENT (OUTPATIENT)
Dept: OTOLARYNGOLOGY | Facility: CLINIC | Age: 21
End: 2021-05-27

## 2021-07-23 NOTE — OB RN PATIENT PROFILE - BREAST MILK IS MORE DIGESTIBLE, MAKING VOMITING, DIARRHEA, GAS AND CONSTIPATION LESS COMMON
Bedside shift change report given to Khadar Richey (oncoming nurse) by Natasha Prince (offgoing nurse). Report included the following information SBAR, Kardex, Intake/Output, MAR and Recent Results. Statement Selected

## 2021-08-18 ENCOUNTER — FORM ENCOUNTER (OUTPATIENT)
Age: 21
End: 2021-08-18

## 2021-09-01 ENCOUNTER — FORM ENCOUNTER (OUTPATIENT)
Age: 21
End: 2021-09-01

## 2021-10-13 ENCOUNTER — FORM ENCOUNTER (OUTPATIENT)
Age: 21
End: 2021-10-13

## 2021-10-28 ENCOUNTER — FORM ENCOUNTER (OUTPATIENT)
Age: 21
End: 2021-10-28

## 2021-11-16 ENCOUNTER — FORM ENCOUNTER (OUTPATIENT)
Age: 21
End: 2021-11-16

## 2021-11-19 VITALS — WEIGHT: 158 LBS | DIASTOLIC BLOOD PRESSURE: 76 MMHG | SYSTOLIC BLOOD PRESSURE: 112 MMHG

## 2021-11-22 ENCOUNTER — OUTPATIENT (OUTPATIENT)
Dept: OUTPATIENT SERVICES | Facility: HOSPITAL | Age: 21
LOS: 1 days | Discharge: HOME | End: 2021-11-22

## 2021-11-22 VITALS — TEMPERATURE: 99 F

## 2021-11-22 VITALS
TEMPERATURE: 98 F | SYSTOLIC BLOOD PRESSURE: 125 MMHG | RESPIRATION RATE: 18 BRPM | HEART RATE: 103 BPM | DIASTOLIC BLOOD PRESSURE: 71 MMHG

## 2021-11-22 NOTE — OB PROVIDER TRIAGE NOTE - ADDITIONAL INSTRUCTIONS
-labor precautions counseled  -fetal kick count instructions  -PO maternal hydration  -follow up in the office tomorrow for scheduled visit

## 2021-11-22 NOTE — OB PROVIDER TRIAGE NOTE - HISTORY OF PRESENT ILLNESS
22 yo  @40w5d, KEATON 21 dated by first trimester sono, presents complaining of decreased fetal movement from 0400 to 0800 today. She did not attempt eating or drinking juice. She reports she felt mild movement on her way to the hospital and reports she currently feels some movement. Denies contractions, leakage of fluid, vaginal bleeding. She was checked in the office last week and was 1cm dilated. GBS negative.

## 2021-11-22 NOTE — OB PROVIDER TRIAGE NOTE - NSHPPHYSICALEXAM_GEN_ALL_CORE
Physical exam:    Vital Signs Last 24 Hrs  T(F): 98.5 (22 Nov 2021 11:51), Max: 98.6 (22 Nov 2021 11:45)  HR: 103 (22 Nov 2021 11:51) (103 - 103)  BP: 125/71 (22 Nov 2021 11:51) (125/71 - 125/71)  RR: 18 (22 Nov 2021 11:51) (18 - 18)    Gen: NAD  Abdomen: soft, gravid, nontender, with nonpalpable contractions    EFM: 140/mod/pos acc  toco: irritability  SVE: 2/0/-3, vtx, intact  Sono: Vertex, BPP 8/8, MVP Physical exam:    Vital Signs Last 24 Hrs  T(F): 98.5 (22 Nov 2021 11:51), Max: 98.6 (22 Nov 2021 11:45)  HR: 103 (22 Nov 2021 11:51) (103 - 103)  BP: 125/71 (22 Nov 2021 11:51) (125/71 - 125/71)  RR: 18 (22 Nov 2021 11:51) (18 - 18)    Gen: NAD  Abdomen: soft, gravid, nontender, with nonpalpable contractions    EFM: 140/mod/pos acc  toco: irritability  SVE: 2/0/-3, vtx, intact  Sono: Vertex, BPP 8/8, MVP 4.42cn

## 2021-11-22 NOTE — OB PROVIDER TRIAGE NOTE - NSOBPROVIDERNOTE_OBGYN_ALL_OB_FT
20 yo  @40w5d, now reporting positive fetal movement, not in labor, with reassuring maternal and fetal status  -patient offered induction of labor, patient refused for today  -labor precautions counseled  -fetal kick count instructions  -PO maternal hydration  -follow up in the office tomorrow for scheduled visit    Dr Francis and Dr Enrique aware

## 2021-11-22 NOTE — OB PROVIDER TRIAGE NOTE - NSHPLABSRESULTS_GEN_ALL_CORE
04/29/21  Type and Screen: A2B pos  Antibody screen: pos, Anti A1 antibodies  Rubella: immune  Rubeola: immune  Mumps: immune  Varicella: immune   RPR: neg  HbSAg: neg  HIV: NR    1 hour Glucola: 111    11/1/21  HIV: NR  GBS: neg 04/29/21  Type and Screen: A2B pos  Antibody screen: pos, Anti A1 antibodies  Rubella: immune  Rubeola: immune  Mumps: immune  Varicella: immune   RPR: neg  HbSAg: neg  HIV: NR    1 hour Glucola: 111    11/1/21  HIV: NR  GBS: neg    Sonograms:   15w2d: vitable IUP, FH+  35w1d: 2566g (43%), vtx, MVP 5cm

## 2021-11-23 ENCOUNTER — FORM ENCOUNTER (OUTPATIENT)
Age: 21
End: 2021-11-23

## 2021-11-23 ENCOUNTER — INPATIENT (INPATIENT)
Facility: HOSPITAL | Age: 21
LOS: 1 days | Discharge: HOME | End: 2021-11-25
Attending: OBSTETRICS & GYNECOLOGY | Admitting: OBSTETRICS & GYNECOLOGY
Payer: MEDICAID

## 2021-11-23 VITALS
TEMPERATURE: 99 F | HEART RATE: 81 BPM | DIASTOLIC BLOOD PRESSURE: 85 MMHG | RESPIRATION RATE: 16 BRPM | SYSTOLIC BLOOD PRESSURE: 130 MMHG

## 2021-11-23 LAB
AMPHET UR-MCNC: NEGATIVE — SIGNIFICANT CHANGE UP
APPEARANCE UR: ABNORMAL
BACTERIA # UR AUTO: NEGATIVE — SIGNIFICANT CHANGE UP
BARBITURATES UR SCN-MCNC: NEGATIVE — SIGNIFICANT CHANGE UP
BASOPHILS # BLD AUTO: 0.03 K/UL — SIGNIFICANT CHANGE UP (ref 0–0.2)
BASOPHILS NFR BLD AUTO: 0.3 % — SIGNIFICANT CHANGE UP (ref 0–1)
BENZODIAZ UR-MCNC: NEGATIVE — SIGNIFICANT CHANGE UP
BILIRUB UR-MCNC: NEGATIVE — SIGNIFICANT CHANGE UP
BLD GP AB SCN SERPL QL: SIGNIFICANT CHANGE UP
BUPRENORPHINE SCREEN, URINE RESULT: NEGATIVE — SIGNIFICANT CHANGE UP
COCAINE METAB.OTHER UR-MCNC: NEGATIVE — SIGNIFICANT CHANGE UP
COLOR SPEC: SIGNIFICANT CHANGE UP
DIFF PNL FLD: ABNORMAL
EOSINOPHIL # BLD AUTO: 0.04 K/UL — SIGNIFICANT CHANGE UP (ref 0–0.7)
EOSINOPHIL NFR BLD AUTO: 0.4 % — SIGNIFICANT CHANGE UP (ref 0–8)
EPI CELLS # UR: 9 /HPF — HIGH (ref 0–5)
FENTANYL UR QL: NEGATIVE — SIGNIFICANT CHANGE UP
GLUCOSE UR QL: NEGATIVE — SIGNIFICANT CHANGE UP
HCT VFR BLD CALC: 36.6 % — LOW (ref 37–47)
HGB BLD-MCNC: 12.2 G/DL — SIGNIFICANT CHANGE UP (ref 12–16)
HYALINE CASTS # UR AUTO: 2 /LPF — SIGNIFICANT CHANGE UP (ref 0–7)
IMM GRANULOCYTES NFR BLD AUTO: 0.6 % — HIGH (ref 0.1–0.3)
KETONES UR-MCNC: NEGATIVE — SIGNIFICANT CHANGE UP
L&D DRUG SCREEN, URINE: SIGNIFICANT CHANGE UP
LEUKOCYTE ESTERASE UR-ACNC: ABNORMAL
LYMPHOCYTES # BLD AUTO: 2.08 K/UL — SIGNIFICANT CHANGE UP (ref 1.2–3.4)
LYMPHOCYTES # BLD AUTO: 20.1 % — LOW (ref 20.5–51.1)
MCHC RBC-ENTMCNC: 29.7 PG — SIGNIFICANT CHANGE UP (ref 27–31)
MCHC RBC-ENTMCNC: 33.3 G/DL — SIGNIFICANT CHANGE UP (ref 32–37)
MCV RBC AUTO: 89.1 FL — SIGNIFICANT CHANGE UP (ref 81–99)
METHADONE UR-MCNC: NEGATIVE — SIGNIFICANT CHANGE UP
MONOCYTES # BLD AUTO: 0.58 K/UL — SIGNIFICANT CHANGE UP (ref 0.1–0.6)
MONOCYTES NFR BLD AUTO: 5.6 % — SIGNIFICANT CHANGE UP (ref 1.7–9.3)
NEUTROPHILS # BLD AUTO: 7.56 K/UL — HIGH (ref 1.4–6.5)
NEUTROPHILS NFR BLD AUTO: 73 % — SIGNIFICANT CHANGE UP (ref 42.2–75.2)
NITRITE UR-MCNC: NEGATIVE — SIGNIFICANT CHANGE UP
NRBC # BLD: 0 /100 WBCS — SIGNIFICANT CHANGE UP (ref 0–0)
OPIATES UR-MCNC: NEGATIVE — SIGNIFICANT CHANGE UP
OXYCODONE UR-MCNC: NEGATIVE — SIGNIFICANT CHANGE UP
PCP UR-MCNC: NEGATIVE — SIGNIFICANT CHANGE UP
PH UR: 7 — SIGNIFICANT CHANGE UP (ref 5–8)
PLATELET # BLD AUTO: 233 K/UL — SIGNIFICANT CHANGE UP (ref 130–400)
PRENATAL SYPHILIS TEST: SIGNIFICANT CHANGE UP
PROPOXYPHENE QUALITATIVE URINE RESULT: NEGATIVE — SIGNIFICANT CHANGE UP
PROT UR-MCNC: SIGNIFICANT CHANGE UP
RBC # BLD: 4.11 M/UL — LOW (ref 4.2–5.4)
RBC # FLD: 14.1 % — SIGNIFICANT CHANGE UP (ref 11.5–14.5)
RBC CASTS # UR COMP ASSIST: 11 /HPF — HIGH (ref 0–4)
SARS-COV-2 RNA SPEC QL NAA+PROBE: SIGNIFICANT CHANGE UP
SP GR SPEC: 1.01 — LOW (ref 1.01–1.03)
UROBILINOGEN FLD QL: SIGNIFICANT CHANGE UP
WBC # BLD: 10.35 K/UL — SIGNIFICANT CHANGE UP (ref 4.8–10.8)
WBC # FLD AUTO: 10.35 K/UL — SIGNIFICANT CHANGE UP (ref 4.8–10.8)
WBC UR QL: 7 /HPF — HIGH (ref 0–5)

## 2021-11-23 PROCEDURE — 59400 OBSTETRICAL CARE: CPT | Mod: U9

## 2021-11-23 RX ORDER — ONDANSETRON 8 MG/1
4 TABLET, FILM COATED ORAL EVERY 6 HOURS
Refills: 0 | Status: DISCONTINUED | OUTPATIENT
Start: 2021-11-23 | End: 2021-11-23

## 2021-11-23 RX ORDER — SODIUM CHLORIDE 9 MG/ML
3 INJECTION INTRAMUSCULAR; INTRAVENOUS; SUBCUTANEOUS EVERY 8 HOURS
Refills: 0 | Status: DISCONTINUED | OUTPATIENT
Start: 2021-11-23 | End: 2021-11-25

## 2021-11-23 RX ORDER — LANOLIN
1 OINTMENT (GRAM) TOPICAL EVERY 6 HOURS
Refills: 0 | Status: DISCONTINUED | OUTPATIENT
Start: 2021-11-23 | End: 2021-11-25

## 2021-11-23 RX ORDER — TETANUS TOXOID, REDUCED DIPHTHERIA TOXOID AND ACELLULAR PERTUSSIS VACCINE, ADSORBED 5; 2.5; 8; 8; 2.5 [IU]/.5ML; [IU]/.5ML; UG/.5ML; UG/.5ML; UG/.5ML
0.5 SUSPENSION INTRAMUSCULAR ONCE
Refills: 0 | Status: DISCONTINUED | OUTPATIENT
Start: 2021-11-23 | End: 2021-11-25

## 2021-11-23 RX ORDER — FENTANYL/BUPIVACAINE/NS/PF 2MCG/ML-.1
250 PLASTIC BAG, INJECTION (ML) INJECTION
Refills: 0 | Status: DISCONTINUED | OUTPATIENT
Start: 2021-11-23 | End: 2021-11-23

## 2021-11-23 RX ORDER — PRAMOXINE HYDROCHLORIDE 150 MG/15G
1 AEROSOL, FOAM RECTAL EVERY 4 HOURS
Refills: 0 | Status: DISCONTINUED | OUTPATIENT
Start: 2021-11-23 | End: 2021-11-25

## 2021-11-23 RX ORDER — SIMETHICONE 80 MG/1
80 TABLET, CHEWABLE ORAL EVERY 4 HOURS
Refills: 0 | Status: DISCONTINUED | OUTPATIENT
Start: 2021-11-23 | End: 2021-11-25

## 2021-11-23 RX ORDER — DEXAMETHASONE 0.5 MG/5ML
4 ELIXIR ORAL EVERY 6 HOURS
Refills: 0 | Status: DISCONTINUED | OUTPATIENT
Start: 2021-11-23 | End: 2021-11-23

## 2021-11-23 RX ORDER — CITRIC ACID/SODIUM CITRATE 300-500 MG
15 SOLUTION, ORAL ORAL EVERY 6 HOURS
Refills: 0 | Status: DISCONTINUED | OUTPATIENT
Start: 2021-11-23 | End: 2021-11-23

## 2021-11-23 RX ORDER — AER TRAVELER 0.5 G/1
1 SOLUTION RECTAL; TOPICAL EVERY 4 HOURS
Refills: 0 | Status: DISCONTINUED | OUTPATIENT
Start: 2021-11-23 | End: 2021-11-25

## 2021-11-23 RX ORDER — ACETAMINOPHEN 500 MG
975 TABLET ORAL
Refills: 0 | Status: DISCONTINUED | OUTPATIENT
Start: 2021-11-23 | End: 2021-11-25

## 2021-11-23 RX ORDER — IBUPROFEN 200 MG
600 TABLET ORAL EVERY 6 HOURS
Refills: 0 | Status: DISCONTINUED | OUTPATIENT
Start: 2021-11-23 | End: 2021-11-25

## 2021-11-23 RX ORDER — IBUPROFEN 200 MG
600 TABLET ORAL EVERY 6 HOURS
Refills: 0 | Status: COMPLETED | OUTPATIENT
Start: 2021-11-23 | End: 2022-10-22

## 2021-11-23 RX ORDER — OXYTOCIN 10 UNIT/ML
333.33 VIAL (ML) INJECTION
Qty: 20 | Refills: 0 | Status: DISCONTINUED | OUTPATIENT
Start: 2021-11-23 | End: 2021-11-23

## 2021-11-23 RX ORDER — SODIUM CHLORIDE 9 MG/ML
1000 INJECTION, SOLUTION INTRAVENOUS
Refills: 0 | Status: DISCONTINUED | OUTPATIENT
Start: 2021-11-23 | End: 2021-11-23

## 2021-11-23 RX ORDER — OXYTOCIN 10 UNIT/ML
2 VIAL (ML) INJECTION
Qty: 30 | Refills: 0 | Status: DISCONTINUED | OUTPATIENT
Start: 2021-11-23 | End: 2021-11-23

## 2021-11-23 RX ORDER — MAGNESIUM HYDROXIDE 400 MG/1
30 TABLET, CHEWABLE ORAL
Refills: 0 | Status: DISCONTINUED | OUTPATIENT
Start: 2021-11-23 | End: 2021-11-25

## 2021-11-23 RX ORDER — NALOXONE HYDROCHLORIDE 4 MG/.1ML
0.1 SPRAY NASAL
Refills: 0 | Status: DISCONTINUED | OUTPATIENT
Start: 2021-11-23 | End: 2021-11-23

## 2021-11-23 RX ORDER — KETOROLAC TROMETHAMINE 30 MG/ML
30 SYRINGE (ML) INJECTION ONCE
Refills: 0 | Status: DISCONTINUED | OUTPATIENT
Start: 2021-11-23 | End: 2021-11-23

## 2021-11-23 RX ORDER — OXYCODONE HYDROCHLORIDE 5 MG/1
5 TABLET ORAL
Refills: 0 | Status: DISCONTINUED | OUTPATIENT
Start: 2021-11-23 | End: 2021-11-25

## 2021-11-23 RX ORDER — DIBUCAINE 1 %
1 OINTMENT (GRAM) RECTAL EVERY 6 HOURS
Refills: 0 | Status: DISCONTINUED | OUTPATIENT
Start: 2021-11-23 | End: 2021-11-25

## 2021-11-23 RX ORDER — OXYCODONE HYDROCHLORIDE 5 MG/1
5 TABLET ORAL ONCE
Refills: 0 | Status: DISCONTINUED | OUTPATIENT
Start: 2021-11-23 | End: 2021-11-25

## 2021-11-23 RX ORDER — HYDROCORTISONE 1 %
1 OINTMENT (GRAM) TOPICAL EVERY 6 HOURS
Refills: 0 | Status: DISCONTINUED | OUTPATIENT
Start: 2021-11-23 | End: 2021-11-25

## 2021-11-23 RX ORDER — BENZOCAINE 10 %
1 GEL (GRAM) MUCOUS MEMBRANE EVERY 6 HOURS
Refills: 0 | Status: DISCONTINUED | OUTPATIENT
Start: 2021-11-23 | End: 2021-11-25

## 2021-11-23 RX ORDER — DIPHENHYDRAMINE HCL 50 MG
25 CAPSULE ORAL EVERY 6 HOURS
Refills: 0 | Status: DISCONTINUED | OUTPATIENT
Start: 2021-11-23 | End: 2021-11-25

## 2021-11-23 RX ORDER — OXYTOCIN 10 UNIT/ML
333.33 VIAL (ML) INJECTION
Qty: 20 | Refills: 0 | Status: DISCONTINUED | OUTPATIENT
Start: 2021-11-23 | End: 2021-11-25

## 2021-11-23 RX ADMIN — Medication 30 MILLIGRAM(S): at 19:26

## 2021-11-23 RX ADMIN — Medication 2 MILLIUNIT(S)/MIN: at 15:41

## 2021-11-23 RX ADMIN — MAGNESIUM HYDROXIDE 30 MILLILITER(S): 400 TABLET, CHEWABLE ORAL at 22:04

## 2021-11-23 RX ADMIN — SODIUM CHLORIDE 125 MILLILITER(S): 9 INJECTION, SOLUTION INTRAVENOUS at 05:49

## 2021-11-23 RX ADMIN — Medication 975 MILLIGRAM(S): at 21:57

## 2021-11-23 RX ADMIN — SODIUM CHLORIDE 3 MILLILITER(S): 9 INJECTION INTRAMUSCULAR; INTRAVENOUS; SUBCUTANEOUS at 22:03

## 2021-11-23 RX ADMIN — Medication 975 MILLIGRAM(S): at 22:00

## 2021-11-23 RX ADMIN — Medication 1000 MILLIUNIT(S)/MIN: at 18:23

## 2021-11-23 RX ADMIN — SODIUM CHLORIDE 125 MILLILITER(S): 9 INJECTION, SOLUTION INTRAVENOUS at 16:37

## 2021-11-23 RX ADMIN — Medication 30 MILLIGRAM(S): at 18:26

## 2021-11-23 NOTE — OB PROVIDER DELIVERY SUMMARY - NSPROVIDERDELIVERYNOTE_OBGYN_ALL_OB_FT
Patient found to be FD and pushing effectively to delivery of healthy infant in ALYSON position at 17:21, APGAR 9/9. Infatn cried spontaneously and moved all four limbs. Delayed cord clmaping performed. Placenta delivered intact, 3VC noted. 2nd degree lac reparied in ususal fashion, hemostasis noted. Uterus massaged and firm. Cervix intact. EBL 200cc

## 2021-11-23 NOTE — PROCEDURE NOTE - ADDITIONAL PROCEDURE DETAILS
0830 Post Procedure Patient evaluated at bedside.  Hemodynamically stable, degree of motor block appropriate for epidural medication administered. Pain is well controlled bilaterally. Patient is aware that the anesthesia team is available 24/7, in case she needs more pain medication or has any other anesthesia-related needs or questions.     .1% Bupivacaine +2ucg/cc Fentanyl epidural infusion at 12 ml/hr    1310 Top Off .25% Bupivacaine 7ML. Pain 5/10, Rt>Lt. Given in small increments after negative aspiration, patient positioned on the right side. Hemodynamically stable, degree of motor block appropriate for epidural medication administered. Patient is aware that the anesthesia team is available 24/7, in case she needs more pain medication or has any other anesthesia-related needs or questions.     .1% Bupivacaine +2ucg/cc Fentanyl epidural infusion increased to 15ml/hr

## 2021-11-23 NOTE — OB PROVIDER H&P - NS_OBGYNHISTORY_OBGYN_ALL_OB_FT
OB hx: . FT NSVDx1, 7-14, no complications  Gyn hx: denies fibroids, cysts, abnormal pap smears, STIs

## 2021-11-23 NOTE — PROGRESS NOTE ADULT - SUBJECTIVE AND OBJECTIVE BOX
Pt evaluated at bedside, c/o painful ctx.    T(C): 37.1 (21 @ 04:15), Max: 37.1 (21 @ 04:15)  HR: 70 (21 @ 05:45) (70 - 103)  BP: 117/- (21 @ 05:45) (117/- - 130/85)  RR: 16 (21 @ 04:15) (16 - 18)    VE: 3/80/-2  FHR: 130 moderate variability, Cat I  Ctx: irregular ctx noted    A/P: 21y.o.  @ 40.6wks in early labor, GBS negative  - Continuous efm and toco  - Epidural anesthesia    Dr. Enrique aware

## 2021-11-23 NOTE — OB PROVIDER DELIVERY SUMMARY - NSSELHIDDEN_OBGYN_ALL_OB_FT
[NS_DeliveryAttending1_OBGYN_ALL_OB_FT:[id number]],[NS_DeliveryRN_OBGYN_ALL_OB_FT:[id number]=]

## 2021-11-23 NOTE — PROGRESS NOTE ADULT - SUBJECTIVE AND OBJECTIVE BOX
Pt assessed for complaint sof pressure. Epidural functioning well. Variable decelerations noted to 90bpm from baseline, spontaneous recovery    VE: 5/80/-1, IUPC placed  EFM: 130bpm baseline, mod variabilty, accels present, variable decels  [place]: ctx q2-3min    Plan  - efm/toco  - IVF/CLD  - pain control prn  - anticipate vaginal delivery

## 2021-11-23 NOTE — PROCEDURE NOTE - NSLABRESULTS_OBGYN_ALL_OB_FT
Consent: Written consent was obtained and risks were reviewed including but not limited to scarring, infection, bleeding, scabbing, incomplete removal, nerve damage and allergy to anesthesia. 12.2   10.35 )-----------( 233      ( 23 Nov 2021 05:50 )             36.6

## 2021-11-23 NOTE — OB PROVIDER H&P - ASSESSMENT
20yo  at 40w6d, GBS neg, antibody screen pos for anti A1 with no h/o blood transfusions, in early labor.   -Admit to L&D  -ADmission labs  -Monitor vitals  -Cont efm/toco  -Clear liquid diet  -Pain management prn, desires epidural at a later time  -Pitocin for augmentation     Dr Hernandez and Dr Enrique aware

## 2021-11-23 NOTE — PROGRESS NOTE ADULT - SUBJECTIVE AND OBJECTIVE BOX
Pt assessed for progress of labor. No compalints. Epidural functioning well. Variable decelerations upon evacuation of fluid, resolved with lateralization and bolus.    VE: 4/80/-2, abundant meconium fluid evacuated  EFM: 130bpm baseline, mod variabilty, accels present, no decels  [place]: ctx q2-3min    Plan  - efm/toco  - IVF/CLD  - pain control prn  - anticipate vaginal delivery

## 2021-11-23 NOTE — OB RN DELIVERY SUMMARY - NSSELHIDDEN_OBGYN_ALL_OB_FT
[NS_DeliveryAttending1_OBGYN_ALL_OB_FT:[id number]],[NS_DeliveryRN_OBGYN_ALL_OB_FT:[id number]=]

## 2021-11-23 NOTE — PROGRESS NOTE ADULT - SUBJECTIVE AND OBJECTIVE BOX
Pt evaluated at bedside, comfortable with epidural.    T(C): 37.2 (21 @ 15:02), Max: 37.2 (21 @ 15:02)  HR: 105 (21 @ 15:35) (70 - 123)  BP: 118/71 (21 @ 15:30) (91/- - 130/85)  RR: 16 (21 @ 04:15) (16 - 16)  SpO2: 100% (21 @ 15:30) (96% - 100%)    VE: 5/90/-1  Ctx: q 2-5 min  FHR: 135 moderate variability, Cat I    A/P: 21y.o.  @ 40.6wks in labor, GBS negative  - Continuous efm and toco  - Will start pitocin augmentation  - Pain management PRN  Dr. Amador aware Pt evaluated at bedside, comfortable with epidural.    T(C): 37.2 (21 @ 15:02), Max: 37.2 (21 @ 15:02)  HR: 105 (21 @ 15:35) (70 - 123)  BP: 118/71 (21 @ 15:30) (91/- - 130/85)  RR: 16 (21 @ 04:15) (16 - 16)  SpO2: 100% (21 @ 15:30) (96% - 100%)    VE: 5/90/-1  Ctx: q 2-5 min, [place] units 95  FHR: 135 moderate variability, Cat I    A/P: 21y.o.  @ 40.6wks in labor, GBS negative  - Continuous efm and toco  - Will start pitocin augmentation  - Pain management PRN  Dr. Amador aware

## 2021-11-23 NOTE — OB PROVIDER H&P - ATTENDING COMMENTS
22 y/o p1001 at 40.6 wks, w/c/o irreg ctx, good fm, no rom, no bleeding.    pnc: nsd x 1, 7-14  anti A1 ab  gbs neg  abd: c=6194 g, nt  ext: no edema  cx: 2-3/90/-1/i/adeq  nst: reactive  toco: irreg ctx    admit, analgesia, arom, anticipate nsd

## 2021-11-23 NOTE — OB PROVIDER H&P - HISTORY OF PRESENT ILLNESS
22yo  at 40w6d, KEATON 21 by 1st trim sonogram, presents to L&D with contractions. They began last night, are intermittent, can last up to 2 hours at a time, and varying in pain intensity. Denies LOF, VB. Reports good FM. Patient was seen in the L&D yesterday for decreased fetal movement, BPP and NST at that time were reassuring, but patient was offered an induction due to post-dates and preferred to labor at home. At that time, she was 2cm dilated. GBS negative.  Patient is Rh pos, on antibody screen was anti A1 positive.

## 2021-11-23 NOTE — OB PROVIDER H&P - NSHPLABSRESULTS_GEN_ALL_CORE
04/29/21  Type and Screen: AB pos  Antibody screen: pos, Anti A1 antibodies  Rubella: immune  Rubeola: immune  Mumps: immune  Varicella: immune   RPR: neg  HbSAg: neg  HIV: NR    1 hour Glucola: 111    11/1/21  HIV: NR  GBS: neg    Sonograms:   15w2d: vitable IUP, FH+  35w1d: 2566g (43%), vtx, MVP 5cm

## 2021-11-23 NOTE — OB PROVIDER H&P - NS_PELVICEXAM_OBGYN_ALL_OB
After Visit Summary   1/18/2018    Katerin Jasso    MRN: [id number]           Patient Information     Date Of Birth          1987        Visit Information        Provider Department      1/18/2018 3:20 PM Nelda Sanders MD [facility]        Today's Diagnoses     Prenatal care, subsequent pregnancy    -  1       Follow-ups after your visit        Your next 10 appointments already scheduled     Jan 26, 2018 10:30 AM CST   New Prenatal with Nelda Sanders MD, [facility]   [facility] ([facility]    [address] [address]   [phone]              Who to contact     If you have questions or need follow up information about today's clinic visit or your schedule please contact [facility] directly at [phone].  Normal or non-critical lab and imaging results will be communicated to you by MyChart, letter or phone within 4 business days after the clinic has received the results. If you do not hear from us within 7 days, please contact the clinic through MyChart or phone. If you have a critical or abnormal lab result, we will notify you by phone as soon as possible.  Submit refill requests through [employer] or call your pharmacy and they will forward the refill request to us. Please allow 3 business days for your refill to be completed.          Additional Information About Your Visit        MyChart Information     [employer] gives you secure access to your electronic health record. If you see a primary care provider, you can also send messages to your care team and make appointments. If you have questions, please call your primary care clinic.  If you do not have a primary care provider, please call [phone] and they will assist you.        Care EveryWhere ID     This is your Care EveryWhere ID. This could be used by other organizations to access your [facility] medical records  [id number]         Your Vitals Were     Last Period                   11/01/2017            Blood Pressure from Last 3 Encounters:   12/13/17 124/81   04/05/17 116/56   07/28/16 111/75    Weight from Last 3 Encounters:   12/13/17 65 kg (143 lb 3.2 oz)   04/05/17 61.7 kg (136 lb)   07/28/16 61.7 kg (136 lb)              Today, you had the following     No orders found for display       Primary Care Provider Office Phone # Fax #    Norma Sharma [phone] [address]  [facility] [place] [address]        Equal Access to Services     [facility] TAWANA : Hadii adriana alvarez hadasho Somisty, waaxda luhenryadaha, qaybta kaalmada alvinyatatianna, cheryl gupta . So [facility] [phone].    ATENCIÓN: Si habla español, tiene a kee disposición servicios gratuitos de asistencia lingüística. Llame al [phone].    We comply with applicable federal civil rights laws and [place] laws. We do not discriminate on the basis of race, color, national origin, age, disability, sex, sexual orientation, or gender identity.            Thank you!     Thank you for choosing [facility]  for your care. Our goal is always to provide you with excellent care. Hearing back from our patients is one way we can continue to improve our services. Please take a few minutes to complete the written survey that you may receive in the mail after your visit with us. Thank you!             Your Updated Medication List - Protect others around you: Learn how to safely use, store and throw away your medicines at www.disposemymeds.org.          This list is accurate as of: 1/18/18  3:39 PM.  Always use your most recent med list.                   Brand Name Dispense Instructions for use Diagnosis    * albuterol 108 (90 BASE) MCG/ACT Inhaler    VENTOLIN HFA    1 Inhaler    INHALE 2 PUFFS EVERY 4-6 HOURS AS NEEDED FOR SHORTNESS OF BREATH    Mild intermittent asthma without complication       * albuterol (2.5 MG/3ML) 0.083% neb solution      60 vial    Take 1 vial (2.5 mg) by nebulization every 6 hours as needed for shortness of breath / dyspnea or wheezing    Bronchitis, acute, with bronchospasm       fluticasone 100 MCG/BLIST Aepb    FLOVENT DISKUS    3 Inhaler    Inhale 1 puff into the lungs 2 times daily    Mild intermittent asthma without complication       * Notice:  This list has 2 medication(s) that are the same as other medications prescribed for you. Read the directions carefully, and ask your doctor or other care provider to review them with you.       Yes

## 2021-11-23 NOTE — OB PROVIDER H&P - NSHPPHYSICALEXAM_GEN_ALL_CORE
Physical exam:    Vital Signs Last 24 Hrs  T(F): 98.7 (23 Nov 2021 04:15), Max: 98.7 (23 Nov 2021 04:15)  HR: 81 (23 Nov 2021 04:18) (81 - 103)  BP: 130/85 (23 Nov 2021 04:18) (125/71 - 130/85)  RR: 16 (23 Nov 2021 04:15) (16 - 18)    Gen: AAOx3, NAD  Abdomen: Soft, nontender, no distension, gravid    EFM: 125/mod armen/pos accel/armen decels  toco: irregular  SVE: 3/80/-2, vtx, intact  EFW by leopold: 3600g

## 2021-11-24 LAB
BASOPHILS # BLD AUTO: 0.04 K/UL — SIGNIFICANT CHANGE UP (ref 0–0.2)
BASOPHILS NFR BLD AUTO: 0.3 % — SIGNIFICANT CHANGE UP (ref 0–1)
COVID-19 SPIKE DOMAIN AB INTERP: POSITIVE
COVID-19 SPIKE DOMAIN ANTIBODY RESULT: >250 U/ML — HIGH
EOSINOPHIL # BLD AUTO: 0.08 K/UL — SIGNIFICANT CHANGE UP (ref 0–0.7)
EOSINOPHIL NFR BLD AUTO: 0.7 % — SIGNIFICANT CHANGE UP (ref 0–8)
HCT VFR BLD CALC: 30.9 % — LOW (ref 37–47)
HGB BLD-MCNC: 10.3 G/DL — LOW (ref 12–16)
IMM GRANULOCYTES NFR BLD AUTO: 0.6 % — HIGH (ref 0.1–0.3)
LYMPHOCYTES # BLD AUTO: 18.2 % — LOW (ref 20.5–51.1)
LYMPHOCYTES # BLD AUTO: 2.19 K/UL — SIGNIFICANT CHANGE UP (ref 1.2–3.4)
MCHC RBC-ENTMCNC: 29.5 PG — SIGNIFICANT CHANGE UP (ref 27–31)
MCHC RBC-ENTMCNC: 33.3 G/DL — SIGNIFICANT CHANGE UP (ref 32–37)
MCV RBC AUTO: 88.5 FL — SIGNIFICANT CHANGE UP (ref 81–99)
MONOCYTES # BLD AUTO: 0.9 K/UL — HIGH (ref 0.1–0.6)
MONOCYTES NFR BLD AUTO: 7.5 % — SIGNIFICANT CHANGE UP (ref 1.7–9.3)
NEUTROPHILS # BLD AUTO: 8.78 K/UL — HIGH (ref 1.4–6.5)
NEUTROPHILS NFR BLD AUTO: 72.7 % — SIGNIFICANT CHANGE UP (ref 42.2–75.2)
NRBC # BLD: 0 /100 WBCS — SIGNIFICANT CHANGE UP (ref 0–0)
PLATELET # BLD AUTO: 219 K/UL — SIGNIFICANT CHANGE UP (ref 130–400)
RBC # BLD: 3.49 M/UL — LOW (ref 4.2–5.4)
RBC # FLD: 14.4 % — SIGNIFICANT CHANGE UP (ref 11.5–14.5)
SARS-COV-2 IGG+IGM SERPL QL IA: >250 U/ML — HIGH
SARS-COV-2 IGG+IGM SERPL QL IA: POSITIVE
WBC # BLD: 12.06 K/UL — HIGH (ref 4.8–10.8)
WBC # FLD AUTO: 12.06 K/UL — HIGH (ref 4.8–10.8)

## 2021-11-24 RX ADMIN — Medication 600 MILLIGRAM(S): at 00:24

## 2021-11-24 RX ADMIN — Medication 1 SPRAY(S): at 10:16

## 2021-11-24 RX ADMIN — SODIUM CHLORIDE 3 MILLILITER(S): 9 INJECTION INTRAMUSCULAR; INTRAVENOUS; SUBCUTANEOUS at 15:35

## 2021-11-24 RX ADMIN — Medication 600 MILLIGRAM(S): at 06:05

## 2021-11-24 RX ADMIN — MAGNESIUM HYDROXIDE 30 MILLILITER(S): 400 TABLET, CHEWABLE ORAL at 10:16

## 2021-11-24 RX ADMIN — Medication 975 MILLIGRAM(S): at 16:01

## 2021-11-24 RX ADMIN — Medication 600 MILLIGRAM(S): at 12:06

## 2021-11-24 RX ADMIN — Medication 975 MILLIGRAM(S): at 21:23

## 2021-11-24 RX ADMIN — Medication 600 MILLIGRAM(S): at 11:36

## 2021-11-24 RX ADMIN — Medication 600 MILLIGRAM(S): at 17:49

## 2021-11-24 RX ADMIN — Medication 600 MILLIGRAM(S): at 00:54

## 2021-11-24 RX ADMIN — Medication 975 MILLIGRAM(S): at 04:18

## 2021-11-24 RX ADMIN — SODIUM CHLORIDE 3 MILLILITER(S): 9 INJECTION INTRAMUSCULAR; INTRAVENOUS; SUBCUTANEOUS at 05:57

## 2021-11-24 RX ADMIN — Medication 975 MILLIGRAM(S): at 03:48

## 2021-11-24 RX ADMIN — Medication 975 MILLIGRAM(S): at 15:31

## 2021-11-24 RX ADMIN — Medication 600 MILLIGRAM(S): at 06:35

## 2021-11-24 RX ADMIN — Medication 600 MILLIGRAM(S): at 18:19

## 2021-11-24 RX ADMIN — Medication 975 MILLIGRAM(S): at 08:55

## 2021-11-24 RX ADMIN — Medication 975 MILLIGRAM(S): at 08:25

## 2021-11-25 ENCOUNTER — TRANSCRIPTION ENCOUNTER (OUTPATIENT)
Age: 21
End: 2021-11-25

## 2021-11-25 VITALS
SYSTOLIC BLOOD PRESSURE: 113 MMHG | HEART RATE: 86 BPM | RESPIRATION RATE: 18 BRPM | TEMPERATURE: 97 F | DIASTOLIC BLOOD PRESSURE: 71 MMHG

## 2021-11-25 RX ADMIN — Medication 975 MILLIGRAM(S): at 09:14

## 2021-11-25 RX ADMIN — Medication 600 MILLIGRAM(S): at 00:06

## 2021-11-25 RX ADMIN — Medication 600 MILLIGRAM(S): at 11:57

## 2021-11-25 RX ADMIN — Medication 975 MILLIGRAM(S): at 08:44

## 2021-11-25 RX ADMIN — Medication 975 MILLIGRAM(S): at 04:08

## 2021-11-25 RX ADMIN — MAGNESIUM HYDROXIDE 30 MILLILITER(S): 400 TABLET, CHEWABLE ORAL at 08:51

## 2021-11-25 NOTE — DISCHARGE NOTE OB - CLICK TO LAUNCH ORM
. Bilobed Flap Text: The defect edges were debeveled with a #15 scalpel blade.  Given the location of the defect and the proximity to free margins a bilobe flap was deemed most appropriate.  Using a sterile surgical marker, an appropriate bilobe flap drawn around the defect.    The area thus outlined was incised deep to adipose tissue with a #15 scalpel blade.  The skin margins were undermined to an appropriate distance in all directions utilizing iris scissors.

## 2021-11-25 NOTE — DISCHARGE NOTE OB - CARE PROVIDER_API CALL
Lucian Castro)  Obstetrics and Gynecology  [address] [place] [address]  Phone: [phone]  Fax: [phone]  Established Patient  Follow Up Time:

## 2021-11-25 NOTE — PROGRESS NOTE ADULT - SUBJECTIVE AND OBJECTIVE BOX
OB attending  PPD #2  Pt doing well, pain well controlled. No overnight events, no acute complaints.    Ambulating: Yes  Voiding: Yes  Flatus: Yes  Bowel movements: Yes   Breast or bottle feeding: Breastfeeding  Diet: Regular    PAST MEDICAL & SURGICAL HISTORY:  No pertinent past medical history    No significant past surgical history        Physical Exam  Vital Signs Last 24 Hrs  T(C): 36.3 (2021 07:48), Max: 36.7 (2021 15:35)  T(F): 97.4 (2021 07:48), Max: 98.1 (2021 15:35)  HR: 86 (2021 07:48) (73 - 86)  BP: 113/71 (2021 07:48) (105/57 - 113/71)  BP(mean): --  RR: 18 (2021 07:48) (18 - 18)  SpO2: --  Gen: AAOx3, NAD  Abd: Soft, nontender, nondistended, BS+  Fundus: Firm, below umbilicus  Lochia: normal  Ext: No calf tenderness, no swelling    Labs:                        10.3   12.06 )-----------( 219      ( 2021 06:00 )             30.9         A/P: s/p , PPD #2, doing well  - continue current management  d/c home

## 2021-11-25 NOTE — DISCHARGE NOTE OB - PATIENT PORTAL LINK FT
You can access the FollowMyHealth Patient Portal offered by [facility] by registering at the following website: [email or web address]. By joining [employer]’s FollowMyHealth portal, you will also be able to view your health information using other applications (apps) compatible with our system.

## 2021-11-25 NOTE — DISCHARGE NOTE OB - MEDICATION SUMMARY - MEDICATIONS TO TAKE
I will START or STAY ON the medications listed below when I get home from the hospital:    ibuprofen 600 mg oral tablet  -- 1 tab(s) by mouth every 6 hours   -- Do not take this drug if you are pregnant.  It is very important that you take or use this exactly as directed.  Do not skip doses or discontinue unless directed by your doctor.  May cause drowsiness or dizziness.  Obtain medical advice before taking any non-prescription drugs as some may affect the action of this medication.  Take with food or milk.    -- Indication: For pain    acetaminophen 325 mg oral tablet  -- 2 tab(s) by mouth every 6 hours, As Needed  -- Indication: For pain    ibuprofen 600 mg oral tablet  -- 1 tab(s) by mouth every 6 hours, As Needed  -- Indication: For pain    Prenatal Multivitamins with Folic Acid 1 mg oral tablet  -- 1 tab(s) by mouth once a day  -- Indication: For breastfeeding

## 2021-11-29 DIAGNOSIS — Z3A.40 40 WEEKS GESTATION OF PREGNANCY: ICD-10-CM

## 2021-11-29 DIAGNOSIS — O48.0 POST-TERM PREGNANCY: ICD-10-CM

## 2021-11-29 DIAGNOSIS — O36.0930 MATERNAL CARE FOR OTHER RHESUS ISOIMMUNIZATION, THIRD TRIMESTER, NOT APPLICABLE OR UNSPECIFIED: ICD-10-CM

## 2021-12-01 DIAGNOSIS — O26.893 OTHER SPECIFIED PREGNANCY RELATED CONDITIONS, THIRD TRIMESTER: ICD-10-CM

## 2021-12-01 DIAGNOSIS — O26.853 SPOTTING COMPLICATING PREGNANCY, THIRD TRIMESTER: ICD-10-CM

## 2021-12-22 ENCOUNTER — FORM ENCOUNTER (OUTPATIENT)
Age: 21
End: 2021-12-22

## 2022-10-11 ENCOUNTER — NON-APPOINTMENT (OUTPATIENT)
Age: 22
End: 2022-10-11

## 2022-10-11 DIAGNOSIS — Z87.898 PERSONAL HISTORY OF OTHER SPECIFIED CONDITIONS: ICD-10-CM

## 2022-10-11 DIAGNOSIS — Z78.9 OTHER SPECIFIED HEALTH STATUS: ICD-10-CM

## 2022-10-11 DIAGNOSIS — Z98.890 OTHER SPECIFIED POSTPROCEDURAL STATES: ICD-10-CM

## 2022-10-13 ENCOUNTER — APPOINTMENT (OUTPATIENT)
Dept: OBGYN | Facility: CLINIC | Age: 22
End: 2022-10-13

## 2022-10-13 VITALS — WEIGHT: 151 LBS | SYSTOLIC BLOOD PRESSURE: 119 MMHG | DIASTOLIC BLOOD PRESSURE: 73 MMHG

## 2022-10-13 LAB
BILIRUB UR QL STRIP: NORMAL
GLUCOSE UR-MCNC: NORMAL
HCG UR QL: 0.2 EU/DL
HCG UR QL: NEGATIVE
HGB UR QL STRIP.AUTO: NORMAL
KETONES UR-MCNC: NORMAL
LEUKOCYTE ESTERASE UR QL STRIP: NORMAL
NITRITE UR QL STRIP: NORMAL
PH UR STRIP: 6
PROT UR STRIP-MCNC: NORMAL
SP GR UR STRIP: 1.02

## 2022-10-13 PROCEDURE — 99212 OFFICE O/P EST SF 10 MIN: CPT

## 2022-10-13 PROCEDURE — 81025 URINE PREGNANCY TEST: CPT

## 2022-10-13 PROCEDURE — 81003 URINALYSIS AUTO W/O SCOPE: CPT | Mod: QW

## 2022-10-13 RX ORDER — NORETHINDRONE ACETATE AND ETHINYL ESTRADIOL AND FERROUS FUMARATE 1MG-20(24)
1-20 KIT ORAL DAILY
Qty: 1 | Refills: 5 | Status: ACTIVE | COMMUNITY
Start: 2022-10-13 | End: 1900-01-01

## 2022-10-13 NOTE — HISTORY OF PRESENT ILLNESS
[FreeTextEntry1] : pt comes in for a bc rx\par pt reports having oral  surgery and was told by md to go on bc pills for the next 6 month s\par  breast-feeding 2-3 times a day \par  no vag bleeding\par  lmp 6 weeks ago\par  no other meds\par  no sob, no cp, full rom, no dysuria

## 2022-11-08 DIAGNOSIS — R11.0 NAUSEA: ICD-10-CM

## 2022-11-08 RX ORDER — ONDANSETRON 8 MG/1
8 TABLET ORAL
Qty: 60 | Refills: 0 | Status: ACTIVE | COMMUNITY
Start: 2022-11-08 | End: 1900-01-01

## 2022-12-12 ENCOUNTER — LABORATORY RESULT (OUTPATIENT)
Age: 22
End: 2022-12-12

## 2022-12-12 ENCOUNTER — APPOINTMENT (OUTPATIENT)
Dept: OBGYN | Facility: CLINIC | Age: 22
End: 2022-12-12

## 2022-12-12 VITALS
SYSTOLIC BLOOD PRESSURE: 125 MMHG | BODY MASS INDEX: 27.6 KG/M2 | WEIGHT: 150 LBS | DIASTOLIC BLOOD PRESSURE: 41 MMHG | HEIGHT: 62 IN

## 2022-12-12 DIAGNOSIS — Z00.00 ENCOUNTER FOR GENERAL ADULT MEDICAL EXAMINATION W/OUT ABNORMAL FINDINGS: ICD-10-CM

## 2022-12-12 DIAGNOSIS — Z32.01 ENCOUNTER FOR PREGNANCY TEST, RESULT POSITIVE: ICD-10-CM

## 2022-12-12 LAB
BILIRUB UR QL STRIP: NORMAL
GLUCOSE UR-MCNC: NORMAL
HCG UR QL: 0.2 EU/DL
HCG UR QL: POSITIVE
HGB UR QL STRIP.AUTO: NORMAL
KETONES UR-MCNC: 40
LEUKOCYTE ESTERASE UR QL STRIP: NORMAL
NITRITE UR QL STRIP: NORMAL
PH UR STRIP: 6
PROT UR STRIP-MCNC: 30
SP GR UR STRIP: 1.03

## 2022-12-12 PROCEDURE — 99213 OFFICE O/P EST LOW 20 MIN: CPT | Mod: 25

## 2022-12-12 PROCEDURE — 81025 URINE PREGNANCY TEST: CPT

## 2022-12-12 PROCEDURE — 76801 OB US < 14 WKS SINGLE FETUS: CPT

## 2022-12-12 PROCEDURE — 81003 URINALYSIS AUTO W/O SCOPE: CPT | Mod: QW

## 2022-12-12 PROCEDURE — 99395 PREV VISIT EST AGE 18-39: CPT

## 2022-12-12 NOTE — HISTORY OF PRESENT ILLNESS
[FreeTextEntry1] : 21yo  here for annual and +UPT. no complaints. had AUB spotting 3 months ago, some spotting inbewtween unsure of exact LMP. breastfeeding.\par \par omh denies\par psh denies\par meds none\par all nkda\par \par obhx nsd x2 8lbs\par gynhx unremakrable [Patient reported PAP Smear was normal] : Patient reported PAP Smear was normal [PapSmeardate] : 2019

## 2022-12-12 NOTE — PROCEDURE
[Transabdominal OB Sonogram] : Transabdominal OB Sonogram [Intrauterine Pregnancy] : intrauterine pregnancy [Fetal Heart] : fetal heart present [Date: ___] : Date: [unfilled] [Current GA by Sonogram: ___ (wks)] : Current GA by Sonogram: [unfilled]Uwks [Transabdominal OB Sonogram WNL] : Transabdominal OB Sonogram WNL

## 2022-12-13 LAB
ABO + RH PNL BLD: NORMAL
BASOPHILS # BLD AUTO: 0.01 K/UL
BASOPHILS NFR BLD AUTO: 0.2 %
BLD GP AB SCN SERPL QL: NORMAL
C TRACH RRNA SPEC QL NAA+PROBE: NOT DETECTED
EOSINOPHIL # BLD AUTO: 0.02 K/UL
EOSINOPHIL NFR BLD AUTO: 0.3 %
HBV SURFACE AG SER QL: NONREACTIVE
HCT VFR BLD CALC: 34 %
HCV AB SER QL: NONREACTIVE
HCV S/CO RATIO: 0.1 S/CO
HGB BLD-MCNC: 11.6 G/DL
HIV1+2 AB SPEC QL IA.RAPID: NONREACTIVE
IMM GRANULOCYTES NFR BLD AUTO: 0.3 %
LEAD BLD-MCNC: <1 UG/DL
LYMPHOCYTES # BLD AUTO: 1.38 K/UL
LYMPHOCYTES NFR BLD AUTO: 22.7 %
MAN DIFF?: NORMAL
MCHC RBC-ENTMCNC: 29.4 PG
MCHC RBC-ENTMCNC: 34.1 GM/DL
MCV RBC AUTO: 86.3 FL
MEV IGG FLD QL IA: 130 AU/ML
MEV IGG+IGM SER-IMP: POSITIVE
MONOCYTES # BLD AUTO: 0.27 K/UL
MONOCYTES NFR BLD AUTO: 4.4 %
MUV AB SER-ACNC: POSITIVE
MUV IGG SER QL IA: 165 AU/ML
N GONORRHOEA RRNA SPEC QL NAA+PROBE: NOT DETECTED
NEUTROPHILS # BLD AUTO: 4.39 K/UL
NEUTROPHILS NFR BLD AUTO: 72.1 %
PLATELET # BLD AUTO: 202 K/UL
RBC # BLD: 3.94 M/UL
RBC # FLD: 12.5 %
RUBV IGG FLD-ACNC: 3.2 INDEX
RUBV IGG SER-IMP: POSITIVE
SOURCE TP AMPLIFICATION: NORMAL
T PALLIDUM AB SER QL IA: NEGATIVE
VZV AB TITR SER: POSITIVE
VZV IGG SER IF-ACNC: 2124 INDEX
WBC # FLD AUTO: 6.09 K/UL

## 2022-12-14 LAB — BACTERIA UR CULT: NORMAL

## 2022-12-23 LAB — CYTOLOGY CVX/VAG DOC THIN PREP: NORMAL

## 2023-01-24 ENCOUNTER — APPOINTMENT (OUTPATIENT)
Dept: OBGYN | Facility: CLINIC | Age: 23
End: 2023-01-24
Payer: MEDICAID

## 2023-01-24 VITALS — SYSTOLIC BLOOD PRESSURE: 106 MMHG | WEIGHT: 156 LBS | DIASTOLIC BLOOD PRESSURE: 69 MMHG

## 2023-01-24 LAB
BILIRUB UR QL STRIP: NORMAL
GLUCOSE UR-MCNC: NORMAL
HCG UR QL: 0.2 EU/DL
HGB UR QL STRIP.AUTO: NORMAL
KETONES UR-MCNC: NORMAL
LEUKOCYTE ESTERASE UR QL STRIP: NORMAL
NITRITE UR QL STRIP: NORMAL
PH UR STRIP: 6.5
PROT UR STRIP-MCNC: NORMAL
SP GR UR STRIP: 1.02

## 2023-01-24 PROCEDURE — 0501F PRENATAL FLOW SHEET: CPT | Mod: NC

## 2023-01-24 PROCEDURE — 81003 URINALYSIS AUTO W/O SCOPE: CPT | Mod: QW

## 2023-01-24 PROCEDURE — 76816 OB US FOLLOW-UP PER FETUS: CPT

## 2023-02-10 ENCOUNTER — NON-APPOINTMENT (OUTPATIENT)
Age: 23
End: 2023-02-10

## 2023-02-27 ENCOUNTER — APPOINTMENT (OUTPATIENT)
Dept: OBGYN | Facility: CLINIC | Age: 23
End: 2023-02-27
Payer: MEDICAID

## 2023-02-27 VITALS — DIASTOLIC BLOOD PRESSURE: 72 MMHG | SYSTOLIC BLOOD PRESSURE: 106 MMHG | WEIGHT: 165 LBS

## 2023-02-27 LAB
BILIRUB UR QL STRIP: NORMAL
GLUCOSE UR-MCNC: NORMAL
HCG UR QL: 0.2 EU/DL
HGB UR QL STRIP.AUTO: NORMAL
KETONES UR-MCNC: NORMAL
LEUKOCYTE ESTERASE UR QL STRIP: NORMAL
NITRITE UR QL STRIP: NORMAL
PH UR STRIP: 8.5
PROT UR STRIP-MCNC: NORMAL
SP GR UR STRIP: 1.01

## 2023-02-27 PROCEDURE — 81003 URINALYSIS AUTO W/O SCOPE: CPT | Mod: QW

## 2023-02-27 PROCEDURE — 0502F SUBSEQUENT PRENATAL CARE: CPT | Mod: NC

## 2023-03-20 ENCOUNTER — APPOINTMENT (OUTPATIENT)
Dept: OBGYN | Facility: CLINIC | Age: 23
End: 2023-03-20
Payer: MEDICAID

## 2023-03-20 VITALS — SYSTOLIC BLOOD PRESSURE: 106 MMHG | WEIGHT: 168 LBS | DIASTOLIC BLOOD PRESSURE: 67 MMHG

## 2023-03-20 PROCEDURE — 81003 URINALYSIS AUTO W/O SCOPE: CPT | Mod: QW

## 2023-03-20 PROCEDURE — 0502F SUBSEQUENT PRENATAL CARE: CPT | Mod: NC

## 2023-03-23 LAB
BACTERIA UR CULT: NORMAL
GLUCOSE 1H P 50 G GLC PO SERPL-MCNC: 66 MG/DL

## 2023-03-29 ENCOUNTER — NON-APPOINTMENT (OUTPATIENT)
Age: 23
End: 2023-03-29

## 2023-04-25 ENCOUNTER — APPOINTMENT (OUTPATIENT)
Dept: OBGYN | Facility: CLINIC | Age: 23
End: 2023-04-25
Payer: MEDICAID

## 2023-04-25 VITALS — WEIGHT: 177 LBS | SYSTOLIC BLOOD PRESSURE: 99 MMHG | DIASTOLIC BLOOD PRESSURE: 66 MMHG

## 2023-04-25 DIAGNOSIS — Z34.90 ENCOUNTER FOR SUPERVISION OF NORMAL PREGNANCY, UNSPECIFIED, UNSPECIFIED TRIMESTER: ICD-10-CM

## 2023-04-25 LAB
BILIRUB UR QL STRIP: NORMAL
GLUCOSE UR-MCNC: NORMAL
HCG UR QL: 1 EU/DL
HGB UR QL STRIP.AUTO: NORMAL
KETONES UR-MCNC: NORMAL
LEUKOCYTE ESTERASE UR QL STRIP: NORMAL
NITRITE UR QL STRIP: NORMAL
PH UR STRIP: 7
PROT UR STRIP-MCNC: NORMAL
SP GR UR STRIP: 1.02

## 2023-04-25 PROCEDURE — 81003 URINALYSIS AUTO W/O SCOPE: CPT | Mod: QW

## 2023-04-25 PROCEDURE — 0502F SUBSEQUENT PRENATAL CARE: CPT | Mod: NC

## 2023-05-03 DIAGNOSIS — D64.9 ANEMIA, UNSPECIFIED: ICD-10-CM

## 2023-05-03 LAB
BASOPHILS # BLD AUTO: 0.02 K/UL
BASOPHILS NFR BLD AUTO: 0.3 %
EOSINOPHIL # BLD AUTO: 0.04 K/UL
EOSINOPHIL NFR BLD AUTO: 0.5 %
HCT VFR BLD CALC: 32.8 %
HGB BLD-MCNC: 10.7 G/DL
IMM GRANULOCYTES NFR BLD AUTO: 0.9 %
LYMPHOCYTES # BLD AUTO: 1.41 K/UL
LYMPHOCYTES NFR BLD AUTO: 17.6 %
MAN DIFF?: NORMAL
MCHC RBC-ENTMCNC: 30.1 PG
MCHC RBC-ENTMCNC: 32.6 GM/DL
MCV RBC AUTO: 92.4 FL
MONOCYTES # BLD AUTO: 0.49 K/UL
MONOCYTES NFR BLD AUTO: 6.1 %
NEUTROPHILS # BLD AUTO: 5.97 K/UL
NEUTROPHILS NFR BLD AUTO: 74.6 %
PLATELET # BLD AUTO: 181 K/UL
RBC # BLD: 3.55 M/UL
RBC # FLD: 13.9 %
WBC # FLD AUTO: 8 K/UL

## 2023-05-03 RX ORDER — CHLORHEXIDINE GLUCONATE 4 %
325 (65 FE) LIQUID (ML) TOPICAL DAILY
Qty: 30 | Refills: 5 | Status: ACTIVE | COMMUNITY
Start: 2023-05-03 | End: 1900-01-01

## 2023-05-05 ENCOUNTER — NON-APPOINTMENT (OUTPATIENT)
Age: 23
End: 2023-05-05

## 2023-05-30 ENCOUNTER — APPOINTMENT (OUTPATIENT)
Dept: OBGYN | Facility: CLINIC | Age: 23
End: 2023-05-30
Payer: MEDICAID

## 2023-05-30 VITALS
BODY MASS INDEX: 33.13 KG/M2 | WEIGHT: 180 LBS | DIASTOLIC BLOOD PRESSURE: 76 MMHG | HEIGHT: 62 IN | SYSTOLIC BLOOD PRESSURE: 110 MMHG

## 2023-05-30 LAB
BILIRUB UR QL STRIP: NORMAL
GLUCOSE UR-MCNC: NORMAL
HCG UR QL: 1 EU/DL
HGB UR QL STRIP.AUTO: NORMAL
KETONES UR-MCNC: NORMAL
LEUKOCYTE ESTERASE UR QL STRIP: NORMAL
NITRITE UR QL STRIP: NORMAL
PH UR STRIP: 6.5
PROT UR STRIP-MCNC: NORMAL
SP GR UR STRIP: 1.02

## 2023-05-30 PROCEDURE — 0502F SUBSEQUENT PRENATAL CARE: CPT

## 2023-05-31 LAB
HCV AB SER QL: NONREACTIVE
HCV S/CO RATIO: 0.09 S/CO
HIV1+2 AB SPEC QL IA.RAPID: NONREACTIVE
T PALLIDUM AB SER QL IA: NEGATIVE

## 2023-06-02 LAB — B-HEM STREP SPEC QL CULT: NORMAL

## 2023-06-05 ENCOUNTER — APPOINTMENT (OUTPATIENT)
Dept: OBGYN | Facility: CLINIC | Age: 23
End: 2023-06-05
Payer: MEDICAID

## 2023-06-05 ENCOUNTER — NON-APPOINTMENT (OUTPATIENT)
Age: 23
End: 2023-06-05

## 2023-06-05 VITALS — DIASTOLIC BLOOD PRESSURE: 74 MMHG | SYSTOLIC BLOOD PRESSURE: 167 MMHG | BODY MASS INDEX: 32.37 KG/M2 | WEIGHT: 177 LBS

## 2023-06-05 LAB
BILIRUB UR QL STRIP: NORMAL
GLUCOSE UR-MCNC: NORMAL
HCG UR QL: 0.2 EU/DL
HGB UR QL STRIP.AUTO: NORMAL
KETONES UR-MCNC: NORMAL
LEUKOCYTE ESTERASE UR QL STRIP: NORMAL
NITRITE UR QL STRIP: NORMAL
PH UR STRIP: 7
PROT UR STRIP-MCNC: NORMAL
SP GR UR STRIP: 1.01

## 2023-06-05 PROCEDURE — 0502F SUBSEQUENT PRENATAL CARE: CPT

## 2023-06-05 PROCEDURE — 76816 OB US FOLLOW-UP PER FETUS: CPT

## 2023-06-14 ENCOUNTER — APPOINTMENT (OUTPATIENT)
Dept: OBGYN | Facility: CLINIC | Age: 23
End: 2023-06-14
Payer: MEDICAID

## 2023-06-14 VITALS — DIASTOLIC BLOOD PRESSURE: 71 MMHG | WEIGHT: 178 LBS | SYSTOLIC BLOOD PRESSURE: 107 MMHG | BODY MASS INDEX: 32.56 KG/M2

## 2023-06-14 LAB
BILIRUB UR QL STRIP: NORMAL
GLUCOSE UR-MCNC: NORMAL
HCG UR QL: 0.2 EU/DL
HGB UR QL STRIP.AUTO: NORMAL
KETONES UR-MCNC: NORMAL
LEUKOCYTE ESTERASE UR QL STRIP: NORMAL
NITRITE UR QL STRIP: NORMAL
PH UR STRIP: 7.5
PROT UR STRIP-MCNC: NORMAL
SP GR UR STRIP: 1.01

## 2023-06-14 PROCEDURE — 0502F SUBSEQUENT PRENATAL CARE: CPT

## 2023-06-19 ENCOUNTER — APPOINTMENT (OUTPATIENT)
Dept: OBGYN | Facility: CLINIC | Age: 23
End: 2023-06-19

## 2023-06-28 ENCOUNTER — RESULT CHARGE (OUTPATIENT)
Age: 23
End: 2023-06-28

## 2023-06-28 ENCOUNTER — APPOINTMENT (OUTPATIENT)
Dept: OBGYN | Facility: CLINIC | Age: 23
End: 2023-06-28
Payer: MEDICAID

## 2023-06-28 VITALS — SYSTOLIC BLOOD PRESSURE: 108 MMHG | WEIGHT: 180 LBS | DIASTOLIC BLOOD PRESSURE: 73 MMHG | BODY MASS INDEX: 32.92 KG/M2

## 2023-06-28 LAB
BILIRUB UR QL STRIP: NORMAL
GLUCOSE UR-MCNC: NORMAL
HCG UR QL: 1 EU/DL
HGB UR QL STRIP.AUTO: NORMAL
KETONES UR-MCNC: NORMAL
LEUKOCYTE ESTERASE UR QL STRIP: NORMAL
NITRITE UR QL STRIP: NORMAL
PH UR STRIP: 7.5
PROT UR STRIP-MCNC: NORMAL
SP GR UR STRIP: 1.02

## 2023-06-28 PROCEDURE — 0502F SUBSEQUENT PRENATAL CARE: CPT

## 2023-06-28 PROCEDURE — 76818 FETAL BIOPHYS PROFILE W/NST: CPT

## 2023-06-29 ENCOUNTER — INPATIENT (INPATIENT)
Facility: HOSPITAL | Age: 23
LOS: 1 days | Discharge: HOME | DRG: 560 | End: 2023-07-01
Attending: OBSTETRICS & GYNECOLOGY | Admitting: SPECIALIST
Payer: MEDICAID

## 2023-06-29 ENCOUNTER — NON-APPOINTMENT (OUTPATIENT)
Age: 23
End: 2023-06-29

## 2023-06-29 VITALS — DIASTOLIC BLOOD PRESSURE: 73 MMHG | HEART RATE: 96 BPM | SYSTOLIC BLOOD PRESSURE: 109 MMHG

## 2023-06-29 DIAGNOSIS — O26.893 OTHER SPECIFIED PREGNANCY RELATED CONDITIONS, THIRD TRIMESTER: ICD-10-CM

## 2023-06-29 DIAGNOSIS — Z3A.00 WEEKS OF GESTATION OF PREGNANCY NOT SPECIFIED: ICD-10-CM

## 2023-06-29 DIAGNOSIS — O26.899 OTHER SPECIFIED PREGNANCY RELATED CONDITIONS, UNSPECIFIED TRIMESTER: ICD-10-CM

## 2023-06-29 PROCEDURE — 86870 RBC ANTIBODY IDENTIFICATION: CPT

## 2023-06-29 PROCEDURE — 81001 URINALYSIS AUTO W/SCOPE: CPT

## 2023-06-29 PROCEDURE — 86850 RBC ANTIBODY SCREEN: CPT

## 2023-06-29 PROCEDURE — 85025 COMPLETE CBC W/AUTO DIFF WBC: CPT

## 2023-06-29 PROCEDURE — 80307 DRUG TEST PRSMV CHEM ANLYZR: CPT

## 2023-06-29 PROCEDURE — 36415 COLL VENOUS BLD VENIPUNCTURE: CPT

## 2023-06-29 PROCEDURE — 86592 SYPHILIS TEST NON-TREP QUAL: CPT

## 2023-06-29 PROCEDURE — 86901 BLOOD TYPING SEROLOGIC RH(D): CPT

## 2023-06-29 PROCEDURE — 59050 FETAL MONITOR W/REPORT: CPT

## 2023-06-29 PROCEDURE — 86900 BLOOD TYPING SEROLOGIC ABO: CPT

## 2023-06-29 PROCEDURE — 80354 DRUG SCREENING FENTANYL: CPT

## 2023-06-29 PROCEDURE — 86905 BLOOD TYPING RBC ANTIGENS: CPT

## 2023-06-29 PROCEDURE — 86880 COOMBS TEST DIRECT: CPT

## 2023-06-29 NOTE — OB RN PATIENT PROFILE - THE IMPORTANCE OF THE CORRECT PLACEMENT OF THE INFANT AND THE PARENT’S ABILITY TO ASSESS THE INFANT'S SKIN COLOR WHILE PLACED ON SKIN TO SKIN HAS BEEN REINFORCED.
Pt advised that HgbA1C was 8.1% on 5/21/20. Pt states that she has only been taking Glipizide 10 mg daily instead of bid as prescribed. Pt has reviewed her medications and does not have Januvia 50 mg daily. New rx for Januvia 50 mg sent to pt's pharmacy.   Statement Selected

## 2023-06-29 NOTE — OB RN PATIENT PROFILE - FALL HARM RISK - UNIVERSAL INTERVENTIONS
Patient:   REINALDO SALDAÑA            MRN: [id number]            FIN: [id number]               Age:   3 hours     Sex:  MALE     :  16   Associated Diagnoses:   Congenital dysplasia of hips, bilateral; Born by breech delivery; Term  delivered by , current hospitalization   Author:   ISAI HERNANDEZ      Basic Information   Admitted from:  Labor and delivery.    Present at bedside:  Family member, Mother.       Review of Systems   Not applicable:  Patient is .       Health Status   Allergies:    Allergic Reactions (All)  NKA   Current medications:  (Selected)   Inpatient Medications  Ordered  sucrose oral 24% solution.: 2 mL, Oral, Once (scheduled), PRN Other (see order comments), 18 14:09:00, Routine, Liquid, MAX Dose 2 mL      Histories      Maternal History   No significant maternal complications   General information   The mother is  35  years old.     : 1.     Para: 0.     Prenatal labs: GBS negative, HIV negative, hepatitis B negative, RPR nonreactive, rubella immune.   mother's blood type O positive  Rupture of membranes:AROM at delivery.     [place] information     [place]   18 10:19 Resuscitation at Birth Routine Stimulation, Delayed Cord Clamping, Suction    Date/Time of Birth 18 10:19    Delivery Type Birth     EGA at Birth 39 2/7 weeks    Gender Male    Delivery Birth Order 1     Multiple Gestation Description [place]     Presentation at Delivery Mau Breech    Delivery Complications Fetus Fetal Malpresentation     Complications Per Provider Dislocation of Hip (One/Both)    Apgar 1 Minute by History 8    Apgar 5 Minute by History 9   18 08:38 Maternal  1    Maternal  Steroids No    Maternal Blood Type Transcribed O Positive    Maternal HBsAg Transcribed Negative    Maternal RPR Transcribed Non Reactive    Maternal Group B Strep Transcribed Negative    Maternal HIV Transcribed Negative     Maternal HIV Transcribed 2 Negative    Maternal Rubella Transcribed Immune    Maternal Chlamydia Transcribed Negative    Maternal Gonorrhea Transcribed Negative    Reason for Maternal Fetal Med Consult Other: AMA, decrease DAVID   .     Full term.     Uncomplicated  delivery.     Gestational Age by Dates: 39  weeks, 2  days.     Growth parameters at birth: Weight 3,510  grams.     APGAR score 1 minute: Total score 8 /10.     APGAR score 5 minutes: Total score 9 /10.     Since delivery infant has: stooled 1  times, taken breast feeding well.     Primary c section for breech.     Family History:    No family history items have been selected or recorded.      Physical Examination   VS/Measurements   Measurements from flowsheet : Height and Weight   18 10:26 CLINICALWEIGHT 3.510 kg    Weight Method Measured    MEDDOSEWT 3.510 kg    CLINICALHEIGHT 52 cm    BSA - Medical History 0.21    BMI-Medical History 13 kg/m2      ,      Vitals between:   2018 14:13:37   TO   2018 14:13:37                   LAST RESULT MINIMUM MAXIMUM  Temperature 37.1 36.5 37.1  Heart Rate 138 128 140  Respiratory Rate 52 40 60     General:  No acute distress, Alert, Responsive.    Eye:  Pupils are equal, round and reactive to light, Normal conjunctiva.         Red reflex: Bilaterally, Present.    HENT:  Normocephalic, Nares patent, Anterior fontanelle open/soft/flat, Ears normally set and rotated, Palate intact.    Neck:  Supple, Full range of motion, Clavicles intact.    Respiratory:  Lungs are clear to auscultation, Respirations are non-labored, Breath sounds are equal, Symmetrical chest wall expansion.    Cardiovascular:  Normal rate, Regular rhythm, No murmur, Good pulses equal in all extremities, Normal peripheral perfusion, No edema.    Gastrointestinal:  Soft, Non-tender, Non-distended, Normal bowel sounds, No organomegaly, Anus patent.    Genitourinary:  Normal genitalia for age and sex.    Musculoskeletal      Normal range of motion.     Spine/torso exam: no spine deformity.     Bilateral hip clunk +, hips are dislocated both sides. .     Integumentary:  Warm, Pink, Intact, No rash.    Neurologic:  Alert, Normal motor function, Moves all extremities appropriately, Yuan, rooting, sucking reflexes are normal, No focal deficits.       Review / Management   Results review:       Labs between:  2018 14:13 to 2018 14:13    POC GLU:                 Latest Result  Latest Date  Minimum  Min Date  Maximum  Max Date                             56 201818 56                             .       Health Maintenance   Medication Administered:  Medication administered Phytonadione, erythromycin 0.5% ophthalmic ointment applied in both eyes, and administered prior to leaving delivery room.       Impression and Plan   Diagnosis     Congenital dysplasia of hips, bilateral (ZPM19-JD Q65.89, Diagnosis, Hospitalized, Medical).     Born by breech delivery (RLC14-IB P03.0, Diagnosis, Hospitalized, Medical).     Term  delivered by , current hospitalization ([id number] Z38.01, Diagnosis, Hospitalized, Medical).     Condition:  Stable.    Plan     - Breast/bottle feed ad brian. Lactation Consultation as needed.  - Hepatitis B vaccine after consent is obtained.  - [place] screen, Cardiac screen, Hearing screen to be done prior to discharge.  - Bilirubin monitoring as per unit protocol.  -Peds ortho consultation for bilat DDH. Dr. Hurd was paged  -Routine  care and anticipatory guidance discussed.  - PCP undecided.     Education and Follow-up:       Counseled: Family, Regarding diagnosis, Regarding treatment, Routine  care, DDH.             Electronically Signed On 2018 15:42  __________________________________________________   ISAI HERNANDEZ              Case was discussed with ortho Dr. Kiersten Khan(she is out of town).  Advised to place the baby's legs in double diapers.    Hemant chang [facility] will be seeing the baby tomorrow  Plan discussed with RN and parents.           Electronically Signed On 2018 16:24  __________________________________________________   ISAI HERNANDEZ     Bed in lowest position, wheels locked, appropriate side rails in place/Call bell, personal items and telephone in reach/Instruct patient to call for assistance before getting out of bed or chair/Non-slip footwear when patient is out of bed/[place] to call system/Physically safe environment - no spills, clutter or unnecessary equipment/Purposeful Proactive Rounding/Room/bathroom lighting operational, light cord in reach

## 2023-06-29 NOTE — OB RN PATIENT PROFILE - FUNCTIONAL ASSESSMENT - BASIC MOBILITY 6.
4-calculated by average/Not able to assess (calculate score using [facility] averaging method)

## 2023-06-29 NOTE — OB RN TRIAGE NOTE - NS_MEANSOFARRIVAL_OBGYN_ALL_OB
Stretcher Ftsg Text: The defect edges were debeveled with a #15 scalpel blade.  Given the location of the defect, shape of the defect and the proximity to free margins a full thickness skin graft was deemed most appropriate.  Using a sterile surgical marker, the primary defect shape was transferred to the donor site. The area thus outlined was incised deep to adipose tissue with a #15 scalpel blade.  The harvested graft was then trimmed of adipose tissue until only dermis and epidermis was left.  The skin margins of the secondary defect were undermined to an appropriate distance in all directions utilizing iris scissors.  The secondary defect was closed with interrupted buried subcutaneous sutures.  The skin edges were then re-apposed with running  sutures.  The skin graft was then placed in the primary defect and oriented appropriately.

## 2023-06-30 LAB
APPEARANCE UR: CLEAR — SIGNIFICANT CHANGE UP
BASOPHILS # BLD AUTO: 0.02 K/UL — SIGNIFICANT CHANGE UP (ref 0–0.2)
BASOPHILS # BLD AUTO: 0.04 K/UL — SIGNIFICANT CHANGE UP (ref 0–0.2)
BASOPHILS NFR BLD AUTO: 0.2 % — SIGNIFICANT CHANGE UP (ref 0–1)
BASOPHILS NFR BLD AUTO: 0.3 % — SIGNIFICANT CHANGE UP (ref 0–1)
BILIRUB UR-MCNC: NEGATIVE — SIGNIFICANT CHANGE UP
COLOR SPEC: SIGNIFICANT CHANGE UP
DIFF PNL FLD: NEGATIVE — SIGNIFICANT CHANGE UP
EOSINOPHIL # BLD AUTO: 0.05 K/UL — SIGNIFICANT CHANGE UP (ref 0–0.7)
EOSINOPHIL # BLD AUTO: 0.05 K/UL — SIGNIFICANT CHANGE UP (ref 0–0.7)
EOSINOPHIL NFR BLD AUTO: 0.4 % — SIGNIFICANT CHANGE UP (ref 0–8)
EOSINOPHIL NFR BLD AUTO: 0.6 % — SIGNIFICANT CHANGE UP (ref 0–8)
GLUCOSE UR QL: NEGATIVE — SIGNIFICANT CHANGE UP
HCT VFR BLD CALC: 33.7 % — LOW (ref 37–47)
HCT VFR BLD CALC: 35.5 % — LOW (ref 37–47)
HGB BLD-MCNC: 11.4 G/DL — LOW (ref 12–16)
HGB BLD-MCNC: 12.1 G/DL — SIGNIFICANT CHANGE UP (ref 12–16)
IMM GRANULOCYTES NFR BLD AUTO: 0.5 % — HIGH (ref 0.1–0.3)
IMM GRANULOCYTES NFR BLD AUTO: 0.6 % — HIGH (ref 0.1–0.3)
KETONES UR-MCNC: ABNORMAL
L&D DRUG SCREEN, URINE: SIGNIFICANT CHANGE UP
LEUKOCYTE ESTERASE UR-ACNC: ABNORMAL
LYMPHOCYTES # BLD AUTO: 19.2 % — LOW (ref 20.5–51.1)
LYMPHOCYTES # BLD AUTO: 2 K/UL — SIGNIFICANT CHANGE UP (ref 1.2–3.4)
LYMPHOCYTES # BLD AUTO: 2.3 K/UL — SIGNIFICANT CHANGE UP (ref 1.2–3.4)
LYMPHOCYTES # BLD AUTO: 22.5 % — SIGNIFICANT CHANGE UP (ref 20.5–51.1)
MCHC RBC-ENTMCNC: 30.3 PG — SIGNIFICANT CHANGE UP (ref 27–31)
MCHC RBC-ENTMCNC: 30.7 PG — SIGNIFICANT CHANGE UP (ref 27–31)
MCHC RBC-ENTMCNC: 33.8 G/DL — SIGNIFICANT CHANGE UP (ref 32–37)
MCHC RBC-ENTMCNC: 34.1 G/DL — SIGNIFICANT CHANGE UP (ref 32–37)
MCV RBC AUTO: 89 FL — SIGNIFICANT CHANGE UP (ref 81–99)
MCV RBC AUTO: 90.8 FL — SIGNIFICANT CHANGE UP (ref 81–99)
MONOCYTES # BLD AUTO: 0.5 K/UL — SIGNIFICANT CHANGE UP (ref 0.1–0.6)
MONOCYTES # BLD AUTO: 0.85 K/UL — HIGH (ref 0.1–0.6)
MONOCYTES NFR BLD AUTO: 5.6 % — SIGNIFICANT CHANGE UP (ref 1.7–9.3)
MONOCYTES NFR BLD AUTO: 7.1 % — SIGNIFICANT CHANGE UP (ref 1.7–9.3)
NEUTROPHILS # BLD AUTO: 6.25 K/UL — SIGNIFICANT CHANGE UP (ref 1.4–6.5)
NEUTROPHILS # BLD AUTO: 8.67 K/UL — HIGH (ref 1.4–6.5)
NEUTROPHILS NFR BLD AUTO: 70.5 % — SIGNIFICANT CHANGE UP (ref 42.2–75.2)
NEUTROPHILS NFR BLD AUTO: 72.5 % — SIGNIFICANT CHANGE UP (ref 42.2–75.2)
NITRITE UR-MCNC: NEGATIVE — SIGNIFICANT CHANGE UP
NRBC # BLD: 0 /100 WBCS — SIGNIFICANT CHANGE UP (ref 0–0)
NRBC # BLD: 0 /100 WBCS — SIGNIFICANT CHANGE UP (ref 0–0)
PH UR: 7 — SIGNIFICANT CHANGE UP (ref 5–8)
PLATELET # BLD AUTO: 162 K/UL — SIGNIFICANT CHANGE UP (ref 130–400)
PLATELET # BLD AUTO: 164 K/UL — SIGNIFICANT CHANGE UP (ref 130–400)
PMV BLD: 11.2 FL — HIGH (ref 7.4–10.4)
PMV BLD: 11.3 FL — HIGH (ref 7.4–10.4)
PRENATAL SYPHILIS TEST: SIGNIFICANT CHANGE UP
PROT UR-MCNC: NEGATIVE — SIGNIFICANT CHANGE UP
RBC # BLD: 3.71 M/UL — LOW (ref 4.2–5.4)
RBC # BLD: 3.99 M/UL — LOW (ref 4.2–5.4)
RBC # FLD: 14.3 % — SIGNIFICANT CHANGE UP (ref 11.5–14.5)
RBC # FLD: 14.5 % — SIGNIFICANT CHANGE UP (ref 11.5–14.5)
SP GR SPEC: 1.01 — SIGNIFICANT CHANGE UP (ref 1.01–1.03)
UROBILINOGEN FLD QL: SIGNIFICANT CHANGE UP
WBC # BLD: 11.97 K/UL — HIGH (ref 4.8–10.8)
WBC # BLD: 8.87 K/UL — SIGNIFICANT CHANGE UP (ref 4.8–10.8)
WBC # FLD AUTO: 11.97 K/UL — HIGH (ref 4.8–10.8)
WBC # FLD AUTO: 8.87 K/UL — SIGNIFICANT CHANGE UP (ref 4.8–10.8)

## 2023-06-30 PROCEDURE — 86077 PHYS BLOOD BANK SERV XMATCH: CPT

## 2023-06-30 PROCEDURE — 59400 OBSTETRICAL CARE: CPT | Mod: U9

## 2023-06-30 RX ORDER — HYDROCORTISONE 1 %
1 OINTMENT (GRAM) TOPICAL EVERY 6 HOURS
Refills: 0 | Status: DISCONTINUED | OUTPATIENT
Start: 2023-06-30 | End: 2023-06-30

## 2023-06-30 RX ORDER — KETOROLAC TROMETHAMINE 30 MG/ML
30 SYRINGE (ML) INJECTION ONCE
Refills: 0 | Status: DISCONTINUED | OUTPATIENT
Start: 2023-06-30 | End: 2023-06-30

## 2023-06-30 RX ORDER — DIPHENHYDRAMINE HCL 50 MG
25 CAPSULE ORAL EVERY 6 HOURS
Refills: 0 | Status: DISCONTINUED | OUTPATIENT
Start: 2023-06-30 | End: 2023-07-01

## 2023-06-30 RX ORDER — PRAMOXINE HYDROCHLORIDE 150 MG/15G
1 AEROSOL, FOAM RECTAL EVERY 4 HOURS
Refills: 0 | Status: DISCONTINUED | OUTPATIENT
Start: 2023-06-30 | End: 2023-06-30

## 2023-06-30 RX ORDER — ACETAMINOPHEN 500 MG
975 TABLET ORAL
Refills: 0 | Status: DISCONTINUED | OUTPATIENT
Start: 2023-06-30 | End: 2023-06-30

## 2023-06-30 RX ORDER — DIBUCAINE 1 %
1 OINTMENT (GRAM) RECTAL EVERY 6 HOURS
Refills: 0 | Status: DISCONTINUED | OUTPATIENT
Start: 2023-06-30 | End: 2023-07-01

## 2023-06-30 RX ORDER — AER TRAVELER 0.5 G/1
1 SOLUTION RECTAL; TOPICAL EVERY 4 HOURS
Refills: 0 | Status: DISCONTINUED | OUTPATIENT
Start: 2023-06-30 | End: 2023-07-01

## 2023-06-30 RX ORDER — TETANUS TOXOID, REDUCED DIPHTHERIA TOXOID AND ACELLULAR PERTUSSIS VACCINE, ADSORBED 5; 2.5; 8; 8; 2.5 [IU]/.5ML; [IU]/.5ML; UG/.5ML; UG/.5ML; UG/.5ML
0.5 SUSPENSION INTRAMUSCULAR ONCE
Refills: 0 | Status: DISCONTINUED | OUTPATIENT
Start: 2023-06-30 | End: 2023-07-01

## 2023-06-30 RX ORDER — FENTANYL/BUPIVACAINE/NS/PF 2MCG/ML-.1
250 PLASTIC BAG, INJECTION (ML) INJECTION
Refills: 0 | Status: DISCONTINUED | OUTPATIENT
Start: 2023-06-30 | End: 2023-06-30

## 2023-06-30 RX ORDER — BENZOCAINE 10 %
1 GEL (GRAM) MUCOUS MEMBRANE EVERY 6 HOURS
Refills: 0 | Status: DISCONTINUED | OUTPATIENT
Start: 2023-06-30 | End: 2023-07-01

## 2023-06-30 RX ORDER — IBUPROFEN 200 MG
600 TABLET ORAL EVERY 6 HOURS
Refills: 0 | Status: DISCONTINUED | OUTPATIENT
Start: 2023-06-30 | End: 2023-07-01

## 2023-06-30 RX ORDER — SODIUM CHLORIDE 9 MG/ML
3 INJECTION INTRAMUSCULAR; INTRAVENOUS; SUBCUTANEOUS EVERY 8 HOURS
Refills: 0 | Status: DISCONTINUED | OUTPATIENT
Start: 2023-06-30 | End: 2023-07-01

## 2023-06-30 RX ORDER — DEXAMETHASONE 0.5 MG/5ML
4 ELIXIR ORAL EVERY 6 HOURS
Refills: 0 | Status: DISCONTINUED | OUTPATIENT
Start: 2023-06-30 | End: 2023-06-30

## 2023-06-30 RX ORDER — SODIUM CHLORIDE 9 MG/ML
1000 INJECTION, SOLUTION INTRAVENOUS
Refills: 0 | Status: DISCONTINUED | OUTPATIENT
Start: 2023-06-29 | End: 2023-06-30

## 2023-06-30 RX ORDER — OXYTOCIN 10 UNIT/ML
333.33 VIAL (ML) INJECTION
Qty: 20 | Refills: 0 | Status: DISCONTINUED | OUTPATIENT
Start: 2023-06-29 | End: 2023-06-30

## 2023-06-30 RX ORDER — NALOXONE HYDROCHLORIDE 4 MG/.1ML
0.1 SPRAY NASAL
Refills: 0 | Status: DISCONTINUED | OUTPATIENT
Start: 2023-06-30 | End: 2023-06-30

## 2023-06-30 RX ORDER — OXYCODONE HYDROCHLORIDE 5 MG/1
5 TABLET ORAL ONCE
Refills: 0 | Status: DISCONTINUED | OUTPATIENT
Start: 2023-06-30 | End: 2023-07-01

## 2023-06-30 RX ORDER — AER TRAVELER 0.5 G/1
1 SOLUTION RECTAL; TOPICAL EVERY 4 HOURS
Refills: 0 | Status: DISCONTINUED | OUTPATIENT
Start: 2023-06-30 | End: 2023-06-30

## 2023-06-30 RX ORDER — ONDANSETRON 8 MG/1
4 TABLET, FILM COATED ORAL EVERY 6 HOURS
Refills: 0 | Status: DISCONTINUED | OUTPATIENT
Start: 2023-06-30 | End: 2023-06-30

## 2023-06-30 RX ORDER — OXYCODONE HYDROCHLORIDE 5 MG/1
5 TABLET ORAL
Refills: 0 | Status: DISCONTINUED | OUTPATIENT
Start: 2023-06-30 | End: 2023-07-01

## 2023-06-30 RX ORDER — OXYTOCIN 10 UNIT/ML
41.67 VIAL (ML) INJECTION
Qty: 20 | Refills: 0 | Status: DISCONTINUED | OUTPATIENT
Start: 2023-06-30 | End: 2023-06-30

## 2023-06-30 RX ORDER — LANOLIN
1 OINTMENT (GRAM) TOPICAL EVERY 6 HOURS
Refills: 0 | Status: DISCONTINUED | OUTPATIENT
Start: 2023-06-30 | End: 2023-06-30

## 2023-06-30 RX ORDER — HYDROCORTISONE 1 %
1 OINTMENT (GRAM) TOPICAL EVERY 6 HOURS
Refills: 0 | Status: DISCONTINUED | OUTPATIENT
Start: 2023-06-30 | End: 2023-07-01

## 2023-06-30 RX ORDER — OXYTOCIN 10 UNIT/ML
41.67 VIAL (ML) INJECTION
Qty: 20 | Refills: 0 | Status: DISCONTINUED | OUTPATIENT
Start: 2023-06-30 | End: 2023-07-01

## 2023-06-30 RX ORDER — CHLORHEXIDINE GLUCONATE 213 G/1000ML
1 SOLUTION TOPICAL DAILY
Refills: 0 | Status: DISCONTINUED | OUTPATIENT
Start: 2023-06-29 | End: 2023-06-30

## 2023-06-30 RX ORDER — SENNA PLUS 8.6 MG/1
2 TABLET ORAL AT BEDTIME
Refills: 0 | Status: DISCONTINUED | OUTPATIENT
Start: 2023-06-30 | End: 2023-07-01

## 2023-06-30 RX ORDER — OXYCODONE HYDROCHLORIDE 5 MG/1
5 TABLET ORAL
Refills: 0 | Status: DISCONTINUED | OUTPATIENT
Start: 2023-06-30 | End: 2023-06-30

## 2023-06-30 RX ORDER — PRAMOXINE HYDROCHLORIDE 150 MG/15G
1 AEROSOL, FOAM RECTAL EVERY 4 HOURS
Refills: 0 | Status: DISCONTINUED | OUTPATIENT
Start: 2023-06-30 | End: 2023-07-01

## 2023-06-30 RX ORDER — DIBUCAINE 1 %
1 OINTMENT (GRAM) RECTAL EVERY 6 HOURS
Refills: 0 | Status: DISCONTINUED | OUTPATIENT
Start: 2023-06-30 | End: 2023-06-30

## 2023-06-30 RX ORDER — BENZOCAINE 10 %
1 GEL (GRAM) MUCOUS MEMBRANE EVERY 6 HOURS
Refills: 0 | Status: DISCONTINUED | OUTPATIENT
Start: 2023-06-30 | End: 2023-06-30

## 2023-06-30 RX ORDER — SIMETHICONE 80 MG/1
80 TABLET, CHEWABLE ORAL EVERY 4 HOURS
Refills: 0 | Status: DISCONTINUED | OUTPATIENT
Start: 2023-06-30 | End: 2023-06-30

## 2023-06-30 RX ORDER — MAGNESIUM HYDROXIDE 400 MG/1
30 TABLET, CHEWABLE ORAL
Refills: 0 | Status: DISCONTINUED | OUTPATIENT
Start: 2023-06-30 | End: 2023-07-01

## 2023-06-30 RX ORDER — MAGNESIUM HYDROXIDE 400 MG/1
30 TABLET, CHEWABLE ORAL
Refills: 0 | Status: DISCONTINUED | OUTPATIENT
Start: 2023-06-30 | End: 2023-06-30

## 2023-06-30 RX ORDER — LANOLIN
1 OINTMENT (GRAM) TOPICAL EVERY 6 HOURS
Refills: 0 | Status: DISCONTINUED | OUTPATIENT
Start: 2023-06-30 | End: 2023-07-01

## 2023-06-30 RX ORDER — IBUPROFEN 200 MG
600 TABLET ORAL EVERY 6 HOURS
Refills: 0 | Status: COMPLETED | OUTPATIENT
Start: 2023-06-30 | End: 2024-05-28

## 2023-06-30 RX ORDER — SIMETHICONE 80 MG/1
80 TABLET, CHEWABLE ORAL EVERY 4 HOURS
Refills: 0 | Status: DISCONTINUED | OUTPATIENT
Start: 2023-06-30 | End: 2023-07-01

## 2023-06-30 RX ORDER — SODIUM CHLORIDE 9 MG/ML
3 INJECTION INTRAMUSCULAR; INTRAVENOUS; SUBCUTANEOUS EVERY 8 HOURS
Refills: 0 | Status: DISCONTINUED | OUTPATIENT
Start: 2023-06-30 | End: 2023-06-30

## 2023-06-30 RX ORDER — ACETAMINOPHEN 500 MG
975 TABLET ORAL
Refills: 0 | Status: DISCONTINUED | OUTPATIENT
Start: 2023-06-30 | End: 2023-07-01

## 2023-06-30 RX ADMIN — Medication 975 MILLIGRAM(S): at 22:19

## 2023-06-30 RX ADMIN — SODIUM CHLORIDE 3 MILLILITER(S): 9 INJECTION INTRAMUSCULAR; INTRAVENOUS; SUBCUTANEOUS at 22:19

## 2023-06-30 RX ADMIN — MAGNESIUM HYDROXIDE 30 MILLILITER(S): 400 TABLET, CHEWABLE ORAL at 16:00

## 2023-06-30 RX ADMIN — SODIUM CHLORIDE 125 MILLILITER(S): 9 INJECTION, SOLUTION INTRAVENOUS at 00:57

## 2023-06-30 RX ADMIN — Medication 600 MILLIGRAM(S): at 19:10

## 2023-06-30 RX ADMIN — Medication 975 MILLIGRAM(S): at 09:01

## 2023-06-30 RX ADMIN — Medication 975 MILLIGRAM(S): at 15:30

## 2023-06-30 RX ADMIN — Medication 975 MILLIGRAM(S): at 20:52

## 2023-06-30 RX ADMIN — Medication 975 MILLIGRAM(S): at 09:31

## 2023-06-30 RX ADMIN — Medication 600 MILLIGRAM(S): at 18:41

## 2023-06-30 RX ADMIN — SENNA PLUS 2 TABLET(S): 8.6 TABLET ORAL at 21:00

## 2023-06-30 RX ADMIN — SODIUM CHLORIDE 3 MILLILITER(S): 9 INJECTION INTRAMUSCULAR; INTRAVENOUS; SUBCUTANEOUS at 14:37

## 2023-06-30 RX ADMIN — Medication 975 MILLIGRAM(S): at 14:53

## 2023-06-30 NOTE — OB RN DELIVERY SUMMARY - NSSELHIDDEN_OBGYN_ALL_OB_FT
[[email or web address]_DeliveryAttending1_OBGYN_ALL_OB_FT:[email or web address]=],[NS_CirculateRN2_OBGYN_ALL_OB_FT:[id number]=]

## 2023-06-30 NOTE — OB PROVIDER H&P - NSLOWPPHRISK_OBGYN_A_OB
No previous uterine incision/[place] Pregnancy/Less than or equal to 4 previous vaginal births/No known bleeding disorder/No history of postpartum hemorrhage/No other PPH risks indicated

## 2023-06-30 NOTE — OB PROVIDER DELIVERY SUMMARY - NSSELHIDDEN_OBGYN_ALL_OB_FT
[NS_DeliveryAttending1_OBGYN_ALL_OB_FT:[email or web address]=],[NS_CirculateRN2_OBGYN_ALL_OB_FT:[id number]=]

## 2023-06-30 NOTE — OB PROVIDER H&P - NSHPPHYSICALEXAM_GEN_ALL_CORE
Vital Signs Last 24 Hrs  T(C): 36.9 (30 Jun 2023 00:21), Max: 36.9 (29 Jun 2023 23:25)  T(F): 98.4 (29 Jun 2023 23:25), Max: 98.4 (29 Jun 2023 23:25)  HR: 96 (30 Jun 2023 00:21) (96 - 96)  BP: 109/73 (30 Jun 2023 00:21) (109/73 - 109/73)  RR: 12 (30 Jun 2023 00:21) (12 - 12)    EFM: 120/moderate/positive accels  [place]: q5min  SVE: 5/80/-2, exam by Dr. Harris reports cephalic position, membranes intact

## 2023-06-30 NOTE — OB PROVIDER H&P - ASSESSMENT
22yo  at GA 40w5d, KEATON 23 by LMP, in labor, desires epidural.     -Admit to L&D  -Admission labs  -Monitor vitals  -Cont EFM/[place]  -Pain management prn  -Clear liquid diet  -Anaesthesia for epidural    Dr. Harris and Dr. Ibrahim aware.  24yo  at GA 40w5d, GBS negative, in labor, desires epidural.     -Admit to L&D  -Admission labs  -Monitor vitals  -Cont EFM/[place]  -Pain management prn  -Clear liquid diet  -Anaesthesia for epidural    Dr. Harris and Dr. Ibrahim aware.

## 2023-06-30 NOTE — PROGRESS NOTE ADULT - ASSESSMENT
24yo  at GA 40w5d, s/p epidural, s/p AROM, in active labor  -f/u UDS  -Monitor vitals  -Cont EFM/[place]  -Pain management prn  -Clear liquid diet    Dr. Ibrahim aware.

## 2023-06-30 NOTE — OB PROVIDER DELIVERY SUMMARY - NSPROVIDERDELIVERYNOTE_OBGYN_ALL_OB_FT
Patient was fully dilated and pushing. Fetal head was OA and restituted to ROT. The anterior and posterior shoulders delivered, followed by the remaining body atraumatically. The  was handed to the mother and RN.  Delayed cord clamping was performed, and then clamped and cut. Cord blood gases collected x2. The placenta delivered intact with membranes. Pitocin was administered. Uterus massaged, fundus found to be firm. Cervix, vagina and perineum inspected first degree laceration was noted, repaired using 2-0 chromic in the usual fashion with good hemostasis.     Viable male infant delivered,  with APGARs 9/9    Laceration: first degree perineal      Dr. Ibrahim present for the delivery

## 2023-06-30 NOTE — OB PROVIDER H&P - HISTORY OF PRESENT ILLNESS
22yo  at GA 40w5d, KEATON 23, by LMP c/w early US, GBS negative, reports irregular contractions since the AM, regular since 2230, now q4min, 7/10 in intensity. Denies excessive vaginal bleeding, leakage of fluid, endorses good fetal movement. Uncomplicated pregnancy.  24 y/o  at 40w5d, KEATON 23, by LMP c/w early US, presents for regular since , now q4min, 7/10 in intensity. Denies vaginal bleeding, leakage of fluid, endorses good fetal movement. Uncomplicated pregnancy. GBS negative.

## 2023-06-30 NOTE — PROCEDURE NOTE - NSDENTALSD_ENT_ALL_CORE
History & Physical       Patient Name: Parrish Suh Location: [facility]   MRN: [id number]    : 1961    Date of Surgery: 2021      Physician: Hayde Medina MD                     HISTORY:  Pre-op Diagnosis: Screening for Colon cancer  Proposed Surgery: Colonoscopy    HPI:  Parrish Suh is a 56year old male is here for endoscopic evaluation for above mentioned reasons. Past Medical History:   Diagnosis Date   â¢ Diabetes mellitus (CMS/HCC)    â¢ Diabetic peripheral neuropathy (CMS/HCC)    â¢ Erectile dysfunction    â¢ HTN (hypertension)       Patient Active Problem List   Diagnosis   â¢ Acute constipation   â¢ Diabetic peripheral neuropathy (CMS/HCC)   â¢ Erectile dysfunction   â¢ Essential hypertension, benign   â¢ Spinal stenosis of lumbar region   â¢ Type 2 diabetes mellitus without complication (CMS/HCC)   â¢ Microalbuminuria due to type 2 diabetes mellitus (CMS/HCC)   â¢ Intermittent claudication (CMS/HCC)   â¢ Pneumonia due to COVID-19 virus   â¢ Hamstring tightness   â¢ Weakness of both lower extremities   â¢ Acute pain of right knee   â¢ Bilateral hip pain     Current Outpatient Medications   Medication Sig Dispense Refill   â¢ Semaglutide, 1 MG/DOSE, (Ozempic, 1 MG/DOSE,) 4 MG/3ML Solution Pen-injector Inject 1 mg into the skin 1 day a week. 10 mL 3   â¢ metformin (GLUCOPHAGE) 1000 MG tablet Take 1 tablet by mouth 2 times daily. 180 tablet 3   â¢ empagliflozin (Jardiance) 25 MG tablet Take 1 tablet by mouth daily. 90 tablet 3   â¢ atorvastatin (LIPITOR) 20 MG tablet Take 1 tablet by mouth daily. 90 tablet 1   â¢ enalapril (VASOTEC) 5 MG tablet Take 1 tablet by mouth daily. 90 tablet 1   â¢ blood glucose (Accu-Chek Ruby Plus) test strip Use as instructed. Test 4 times daily.   Accucheck guide 400 strip 3     Current Facility-Administered Medications   Medication Dose Route Frequency Provider Last Rate Last Admin   â¢ lidocaine-sodium bicarbonate 0.9-8.4% for J-tip administration 0.5-1 mL  0.5-1 mL Subcutaneous MELISSA Redd MD        Or   â¢ lidocaine 1 % injection 5-10 mg  5-10 mg Subcutaneous PRN Kip Andrade MD       â¢ lactated ringers infusion   Intravenous Continuous Kip Khanna MD 10 mL/hr at 21 0831 New Bag at 21 0831   â¢ sodium chloride 0.9% infusion   Intravenous Continuous Kip Andrade MD       â¢ dextrose 5 % infusion   Intravenous Continuous PRN Kip Andrade MD       â¢ ondansetron (ZOFRAN) injection 4 mg  4 mg Intravenous BID PRN Kip Andrade MD       â¢ sodium chloride 0.9% infusion   Intravenous Continuous Kip Andrade MD       â¢ lactated ringers infusion   Intravenous Continuous Kip Andrade MD         ALLERGIES:  No Known Allergies     Social History:  Social History     Socioeconomic History   â¢ Marital status: /Civil Union     Spouse name: Not on file   â¢ Number of children: Not on file   â¢ Years of education: Not on file   â¢ Highest education level: Not on file   Occupational History   â¢ Not on file   Tobacco Use   â¢ Smoking status: Former Smoker     Packs/day: 0.00     Quit date: 1990     Years since quittin.8   â¢ Smokeless tobacco: Never Used   Vaping Use   â¢ Vaping Use: never used   Substance and Sexual Activity   â¢ Alcohol use: Yes     Comment: rarely   â¢ Drug use: Never   â¢ Sexual activity: Not on file   Other Topics Concern   â¢ Not on file   Social History Narrative   â¢ Not on file     Social Determinants of Health     Financial Resource Strain:    â¢ Social Determinants: Financial Resource Strain: Not on file   Food Insecurity:    â¢ Social Determinants: Food Insecurity: Not on file   Transportation Needs:    â¢ Lack of Transportation (Medical): Not on file   â¢ Lack of Transportation (Non-Medical):  Not on file   Physical Activity: Inactive   â¢ Days of Exercise per Week: 0 days   â¢ Minutes of Exercise per Session: 0 min   Stress:    â¢ Social Determinants: Stress: Not on file   Social Connections:    â¢ Social Determinants: Social Connections: Not on file   Intimate Partner Violence:    â¢ Social Determinants: Intimate Partner Violence Past Fear: Not on file   â¢ Social Determinants: Intimate Partner Violence Current Fear: Not on file       PERTINENT REVIEW OF SYSTEMS:   No contributory complaints    PHYSICAL EXAMINATION:  Vitals Signs Stable Yes    Weight Weight    11/12/21 0828   Weight: 94.3 kg (208 lb)      Mental Status: Awake & alert, O x 3 Yes   HEENT: No Gross lesion noted Yes    Pupils round & equal Yes    No icterus Yes   Heart: Regular rate & rhythm Yes   Lungs: Clear to auscultation Yes   Abdomen: Soft and non-tender Yes   Extremities: No clubbing, cyanosis or edema Yes     Other:       Yumiko Mendiola is cleared for surgery in the ambulatory setting.        Electronically signed by Yadi Castillo MD appears normal and intact

## 2023-06-30 NOTE — PRE-ANESTHESIA EVALUATION ADULT - RESPIRATORY RATE (BREATHS/MIN)
DIGITAL SCREENING MAMMOGRAM WITH CAD, 4/15/2021



CLINICAL INFORMATION / INDICATION: Routine screening mammography. SCREENING WITH ISABELLA



TECHNIQUE:  Digital bilateral 2D mammography was obtained in the craniocaudal and mediolateral obliqu
e projections. This examination was interpreted with the benefit of Computer-Aided Detection analysis
.



COMPARISON: 4/9/2019, 4/14/2020



FINDINGS: 



Breast Density: The breasts are heterogeneously dense, which may obscure small masses.



No dominant mass, suspicious calcifications, or architectural distortion in either breast. 



Postlumpectomy and radiation changes are noted on the right. There are stable bilateral calcification
s. Biopsy marking clips are noted on the right.

 

IMPRESSION: Benign findings. No significant change.



Follow up recommendation: Routine yearly



BI-RADS Category 2:  Benign.





-------------------------------------------------------------------------------------------

A "normal" or negative report should not discourage follow up or biopsy of a clinically significant f
inding.



A written summary of these findings will be mailed to the patient. The patient will be entered into a
 mammography reporting system which will generate a reminder letter for the patient's next appointmen
t at the appropriate interval.



The American College of Radiology recommends yearly mammograms starting at age 40 and continuing as l
andrea as a woman is in good health.  Breast MRI is recommended for women with an approximate 20-25% or 
greater lifetime risk of breast cancer, including women with a strong family history of breast or ova
el cancer or who have been treated for Hodgkin's disease.



Signer Name: Amadou Bosch MD 

Signed: 4/15/2021 11:39 AM

Workstation Name: [email or web address]
DIGITAL SCREENING MAMMOGRAM WITH CAD, 4/15/2021



CLINICAL INFORMATION / INDICATION: Routine screening mammography. SCREENING WITH ISABELLA



TECHNIQUE:  Digital bilateral 2D mammography was obtained in the craniocaudal and mediolateral obliqu
e projections. This examination was interpreted with the benefit of Computer-Aided Detection analysis
.



COMPARISON: 4/9/2019, 4/14/2020



FINDINGS: 



Breast Density: The breasts are heterogeneously dense, which may obscure small masses.



No dominant mass, suspicious calcifications, or architectural distortion in either breast. 



Postlumpectomy and radiation changes are noted on the right. There are stable bilateral calcification
s. Biopsy marking clips are noted on the right.

 

IMPRESSION: Benign findings. No significant change.



Follow up recommendation: Routine yearly



BI-RADS Category 2:  Benign.





-------------------------------------------------------------------------------------------

A "normal" or negative report should not discourage follow up or biopsy of a clinically significant f
inding.



A written summary of these findings will be mailed to the patient. The patient will be entered into a
 mammography reporting system which will generate a reminder letter for the patient's next appointmen
t at the appropriate interval.



The American College of Radiology recommends yearly mammograms starting at age 40 and continuing as l
andrea as a woman is in good health.  Breast MRI is recommended for women with an approximate 20-25% or 
greater lifetime risk of breast cancer, including women with a strong family history of breast or ova
el cancer or who have been treated for Hodgkin's disease.



Signer Name: Amadou Bosch MD 

Signed: 4/15/2021 11:39 AM

Workstation Name: [email or web address]
[id number]

## 2023-06-30 NOTE — OB PROVIDER H&P - NSHPLABSRESULTS_GEN_ALL_CORE
GCT 66    Sonograms:  18w2d- EFW: 224g 34%ILE   37w1d- vtx, MVP 4cm, EFW 3165g (61%ile)  40w3d- vtx, BPP 10/10, MVP 6cm

## 2023-06-30 NOTE — PROGRESS NOTE ADULT - SUBJECTIVE AND OBJECTIVE BOX
PGY3 Note    Patient seen at bedside for evaluation of labor progression, doing well, no complaints.     T(F): 98.2 (23 @ 23:46), Max: 98.4 (23 @ 23:25)  HR: 95 (23 @ 02:00) (81 - 96)  BP: 110/60 (23 @ 01:59) (109/73 - 119/78)  RR: 12 (23 @ 00:21) (12 - 19)  SpO2: 100% (23 @ 02:05) (93% - 100%)    EFM: 115/mod/pos acc  TOCO: 3 mins  SVE: 6/80/-2, AROM, Clear    Medications:  (ADM OVERRIDE): 1 Each (23 @ 00:32)  lactated ringers.: 125 mL/Hr (23 @ 23:56)      Labs:                        12.1   8.87  )-----------( 164      ( 2023 00:15 )             35.5           ABO RH Interpretation: AB POS (23 @ 00:15)    Antibody Screen: POS (23 @ 00:15)    Urinalysis Basic - ( 2023 00:15 )    Color: Light Yellow / Appearance: Clear / S.008 / pH: x  Gluc: x / Ketone: Small  / Bili: Negative / Urobili: <2 mg/dL   Blood: x / Protein: Negative / Nitrite: Negative   Leuk Esterase: Small / RBC: 1 /HPF / WBC 5 /HPF   Sq Epi: x / Non Sq Epi: x / Bacteria: Few        Prenatal Syphilis Test: Nonreact (23 @ 00:15)

## 2023-07-01 ENCOUNTER — TRANSCRIPTION ENCOUNTER (OUTPATIENT)
Age: 23
End: 2023-07-01

## 2023-07-01 VITALS
SYSTOLIC BLOOD PRESSURE: 103 MMHG | TEMPERATURE: 98 F | DIASTOLIC BLOOD PRESSURE: 67 MMHG | HEART RATE: 81 BPM | RESPIRATION RATE: 20 BRPM

## 2023-07-01 RX ORDER — SIMETHICONE 80 MG/1
1 TABLET, CHEWABLE ORAL
Qty: 0 | Refills: 0 | DISCHARGE
Start: 2023-07-01

## 2023-07-01 RX ORDER — ACETAMINOPHEN 500 MG
3 TABLET ORAL
Qty: 0 | Refills: 0 | DISCHARGE
Start: 2023-07-01

## 2023-07-01 RX ORDER — IBUPROFEN 200 MG
1 TABLET ORAL
Qty: 0 | Refills: 0 | DISCHARGE
Start: 2023-07-01

## 2023-07-01 RX ADMIN — Medication 975 MILLIGRAM(S): at 09:07

## 2023-07-01 RX ADMIN — Medication 600 MILLIGRAM(S): at 00:52

## 2023-07-01 RX ADMIN — Medication 975 MILLIGRAM(S): at 08:37

## 2023-07-01 RX ADMIN — SODIUM CHLORIDE 3 MILLILITER(S): 9 INJECTION INTRAMUSCULAR; INTRAVENOUS; SUBCUTANEOUS at 05:20

## 2023-07-01 RX ADMIN — Medication 975 MILLIGRAM(S): at 22:01

## 2023-07-01 RX ADMIN — Medication 600 MILLIGRAM(S): at 18:42

## 2023-07-01 RX ADMIN — Medication 975 MILLIGRAM(S): at 04:12

## 2023-07-01 RX ADMIN — Medication 600 MILLIGRAM(S): at 06:45

## 2023-07-01 RX ADMIN — Medication 600 MILLIGRAM(S): at 17:02

## 2023-07-01 RX ADMIN — Medication 600 MILLIGRAM(S): at 01:36

## 2023-07-01 RX ADMIN — MAGNESIUM HYDROXIDE 30 MILLILITER(S): 400 TABLET, CHEWABLE ORAL at 03:51

## 2023-07-01 RX ADMIN — Medication 600 MILLIGRAM(S): at 06:37

## 2023-07-01 RX ADMIN — Medication 975 MILLIGRAM(S): at 03:48

## 2023-07-01 NOTE — DISCHARGE NOTE OB - CARE PLAN
1 Principal Discharge DX:	Vaginal delivery  Assessment and plan of treatment:	Nothing inside the vagina for 6 weeks: no tampons, douching, sexual intercourse, tub baths or pools.   If you have a fever over 100.4F, severe abdominal pain or heavy vaginal bleeding please call your doctor or go to the emergency room.

## 2023-07-01 NOTE — DISCHARGE NOTE OB - MATERIALS PROVIDED
[facility] [place] Screening Program/[place]  Immunization Record/Breastfeeding Log/Guide to Postpartum Care/[facility] Hearing Screen Program/Back To Sleep Handout/Shaken Baby Prevention Handout/Breastfeeding Guide and Packet

## 2023-07-01 NOTE — DISCHARGE NOTE OB - NS MD DC FALL RISK RISK
For information on Fall & Injury Prevention, visit: [email or web address] OR  [email or web address] OR  https://www.cdc.gov/steadi/patient.html

## 2023-07-01 NOTE — PROGRESS NOTE ADULT - SUBJECTIVE AND OBJECTIVE BOX
OB attending  PPD #1    Pt doing well, pain well controlled. No overnight events, no acute complaints.    Ambulating: Yes  Voiding: Yes  Flatus: Yes  Bowel movements: Yes   Breast or bottle feeding: Breastfeeding  Diet: Regular    PAST MEDICAL & SURGICAL HISTORY:  No pertinent past medical history      No significant past surgical history          Physical Exam  Vital Signs Last 24 Hrs  T(C): 36.4 (2023 08:32), Max: 36.9 (2023 15:26)  T(F): 97.6 (2023 08:32), Max: 98.5 (2023 15:26)  HR: 71 (2023 08:32) (71 - 85)  BP: 99/58 (2023 08:32) (99/58 - 105/59)  BP(mean): --  RR: 18 (2023 08:32) (18 - 18)  SpO2: --      Gen: AAOx3, NAD  Abd: Soft, nontender, nondistended, BS+  Fundus: Firm, below umbilicus  Lochia: normal  Ext: No calf tenderness, no swelling    Labs:                        11.4   11.97 )-----------( 162      ( 2023 17:40 )             33.7   Rh+      A/P: s/p , PPD #1, doing well  - continue current management  -d/c home with instructions  -f/u 4-6 wks for pp exam

## 2023-07-01 NOTE — DISCHARGE NOTE OB - MEDICATION SUMMARY - MEDICATIONS TO TAKE
I will START or STAY ON the medications listed below when I get home from the hospital:    ibuprofen 600 mg oral tablet  -- 1 tab(s) by mouth every 6 hours  -- Indication: For pain    acetaminophen 325 mg oral tablet  -- 3 tab(s) by mouth every 6 hours  -- Indication: For pain    Prenatal Multivitamins with Folic Acid 1 mg oral tablet  -- 1 tab(s) by mouth once a day  -- Indication: For postpartum    simethicone 80 mg oral tablet, chewable  -- 1 tab(s) by mouth every 4 hours As needed Gas  -- Indication: For gas pain

## 2023-07-01 NOTE — DISCHARGE NOTE OB - PATIENT PORTAL LINK FT
You can access the FollowMyHealth Patient Portal offered by [facility] by registering at the following website: [email or web address]. By joining [employer]’s FollowMyHealth portal, you will also be able to view your health information using other applications (apps) compatible with our system.

## 2023-07-01 NOTE — DISCHARGE NOTE OB - CARE PROVIDER_API CALL
Roly Watson  Obstetrics and Gynecology  [address] [place] [address]  Phone: [phone]  Fax: [phone]  Follow Up Time:

## 2023-07-03 ENCOUNTER — APPOINTMENT (OUTPATIENT)
Dept: OBGYN | Facility: CLINIC | Age: 23
End: 2023-07-03

## 2023-07-05 DIAGNOSIS — O48.0 POST-TERM PREGNANCY: ICD-10-CM

## 2023-07-05 DIAGNOSIS — Z28.09 IMMUNIZATION NOT CARRIED OUT BECAUSE OF OTHER CONTRAINDICATION: ICD-10-CM

## 2023-07-05 DIAGNOSIS — Z3A.40 40 WEEKS GESTATION OF PREGNANCY: ICD-10-CM

## 2023-08-01 ENCOUNTER — NON-APPOINTMENT (OUTPATIENT)
Age: 23
End: 2023-08-01

## 2023-08-02 ENCOUNTER — APPOINTMENT (OUTPATIENT)
Dept: OBGYN | Facility: CLINIC | Age: 23
End: 2023-08-02
Payer: MEDICAID

## 2023-08-02 VITALS — BODY MASS INDEX: 28.72 KG/M2 | DIASTOLIC BLOOD PRESSURE: 65 MMHG | WEIGHT: 157 LBS | SYSTOLIC BLOOD PRESSURE: 98 MMHG

## 2023-08-02 PROCEDURE — 0503F POSTPARTUM CARE VISIT: CPT

## 2023-08-02 NOTE — HISTORY OF PRESENT ILLNESS
[Postpartum Follow Up] : postpartum follow up [Complications:___] : no complications [] : delivered by vaginal delivery [Breastfeeding] : not currently nursing [S/Sx PP Depression] : no signs/symptoms of postpartum depression [Erythema] : not erythematous [Back to Normal] : is back to normal in size [Mild] : mild vaginal bleeding [Normal] : the vagina was normal [Cervix Sample Taken] : cervical sample not taken for a Pap smear [Examination Of The Breasts] : breasts are normal [Doing Well] : is doing well [No Sign of Infection] : is showing no signs of infection [Excellent Pain Control] : has excellent pain control [None] : None [FreeTextEntry1] : 22 y/o p3003 s/p nsd 8-1 lbs, here for pp exam.  no complaints.  no abn bleeding, pain or dscharge.  + bresatfeeding.  declines b.c.  nsd x 3, largest 8-1  no med or surgical hx  phq 2 neg no absoute contra to b.c.  gc sent from office f/u for annual

## 2023-08-04 LAB
C TRACH RRNA SPEC QL NAA+PROBE: NOT DETECTED
N GONORRHOEA RRNA SPEC QL NAA+PROBE: NOT DETECTED
SOURCE AMPLIFICATION: NORMAL

## 2023-08-16 NOTE — OB PROVIDER TRIAGE NOTE - NS_PELVICEXAM_OBGYN_ALL_OB
Increase torsemide 40 mg twice a day along with an extra potassium   Call me or email me on Tuesday if no improvement in weight  Will plan for IV lasix next week if no improvement in swelling or weight along with blood wor  RTO in 2 weeks Yes

## 2024-01-07 NOTE — PROCEDURE NOTE - NSSITEPREP_SKIN_A_CORE
Medicate with Motrin, Tylenol as needed for pain, discomfort.  Finish antibiotics as directed.    Follow-up with pediatrician for wound reevaluation.    Return to ED if any new, or worsening symptoms.  
povidone iodine (if allergic to chlorhexidine)

## 2024-02-21 NOTE — PROCEDURAL SAFETY CHECKLIST WITH OR WITHOUT SEDATION - NSREADY4TIMEOUT_GEN_ALL_CORE
Patient called and states she had blood work done at [facility] on Monday 2/19/24 and would like us to get those results and call back with results.  Please call [phone]. ar       done

## 2024-03-14 ENCOUNTER — APPOINTMENT (OUTPATIENT)
Dept: OBGYN | Facility: CLINIC | Age: 24
End: 2024-03-14
Payer: MEDICAID

## 2024-03-14 VITALS — WEIGHT: 151 LBS | BODY MASS INDEX: 27.62 KG/M2 | SYSTOLIC BLOOD PRESSURE: 114 MMHG | DIASTOLIC BLOOD PRESSURE: 68 MMHG

## 2024-03-14 DIAGNOSIS — Z30.09 ENCOUNTER FOR OTHER GENERAL COUNSELING AND ADVICE ON CONTRACEPTION: ICD-10-CM

## 2024-03-14 LAB
BILIRUB UR QL STRIP: NORMAL
GLUCOSE UR-MCNC: NORMAL
HCG UR QL: 1 EU/DL
HCG UR QL: NEGATIVE
HGB UR QL STRIP.AUTO: NORMAL
KETONES UR-MCNC: NORMAL
LEUKOCYTE ESTERASE UR QL STRIP: NORMAL
NITRITE UR QL STRIP: NORMAL
PH UR STRIP: 7
PROT UR STRIP-MCNC: NORMAL
SP GR UR STRIP: 1.03

## 2024-03-14 PROCEDURE — 81025 URINE PREGNANCY TEST: CPT

## 2024-03-14 PROCEDURE — 81003 URINALYSIS AUTO W/O SCOPE: CPT | Mod: QW

## 2024-03-14 PROCEDURE — 99212 OFFICE O/P EST SF 10 MIN: CPT

## 2024-03-14 RX ORDER — NORETHINDRONE 0.35 MG/1
0.35 TABLET ORAL
Qty: 1 | Refills: 5 | Status: ACTIVE | COMMUNITY
Start: 2024-03-14 | End: 1900-01-01

## 2024-03-14 NOTE — HISTORY OF PRESENT ILLNESS
[FreeTextEntry1] : 24 y/o p3003 LMP none, nursing, will be undergoing oral surgery, wants b.c.  + nursing.  no pain, abn bleeding or discharge.   nsd x 3, largest 8-1  no med or surgical hx   no absoute contra to b.c.

## 2024-03-14 NOTE — PLAN
[FreeTextEntry1] : 22 y/o p3003 for contraceptive surveillance stable options rev, for Ratna, no absoute contra f/u for annual time 15 min

## 2025-01-22 ENCOUNTER — NON-APPOINTMENT (OUTPATIENT)
Age: 25
End: 2025-01-22

## 2025-01-22 ENCOUNTER — APPOINTMENT (OUTPATIENT)
Dept: OBGYN | Facility: CLINIC | Age: 25
End: 2025-01-22
Payer: MEDICAID

## 2025-01-22 VITALS
WEIGHT: 152 LBS | HEART RATE: 96 BPM | SYSTOLIC BLOOD PRESSURE: 105 MMHG | BODY MASS INDEX: 27.97 KG/M2 | DIASTOLIC BLOOD PRESSURE: 73 MMHG | HEIGHT: 62 IN

## 2025-01-22 DIAGNOSIS — Z30.09 ENCOUNTER FOR OTHER GENERAL COUNSELING AND ADVICE ON CONTRACEPTION: ICD-10-CM

## 2025-01-22 DIAGNOSIS — Z32.01 ENCOUNTER FOR PREGNANCY TEST, RESULT POSITIVE: ICD-10-CM

## 2025-01-22 DIAGNOSIS — O36.80X0 PREGNANCY WITH INCONCLUSIVE FETAL VIABILITY, NOT APPLICABLE OR UNSPECIFIED: ICD-10-CM

## 2025-01-22 DIAGNOSIS — Z01.419 ENCOUNTER FOR GYNECOLOGICAL EXAMINATION (GENERAL) (ROUTINE) W/OUT ABNORMAL FINDINGS: ICD-10-CM

## 2025-01-22 LAB
BILIRUB UR QL STRIP: NORMAL
GLUCOSE UR-MCNC: NORMAL
HCG UR QL: 1 EU/DL
HCG UR QL: POSITIVE
HGB UR QL STRIP.AUTO: NORMAL
KETONES UR-MCNC: NORMAL
LEUKOCYTE ESTERASE UR QL STRIP: NORMAL
NITRITE UR QL STRIP: NORMAL
PH UR STRIP: 7.5
PROT UR STRIP-MCNC: NORMAL
QUALITY CONTROL: YES
SP GR UR STRIP: 1.02

## 2025-01-22 PROCEDURE — 99213 OFFICE O/P EST LOW 20 MIN: CPT | Mod: 25

## 2025-01-22 PROCEDURE — 76801 OB US < 14 WKS SINGLE FETUS: CPT

## 2025-01-22 PROCEDURE — 81003 URINALYSIS AUTO W/O SCOPE: CPT | Mod: QW

## 2025-01-22 PROCEDURE — 81025 URINE PREGNANCY TEST: CPT

## 2025-01-24 LAB
ABO + RH PNL BLD: NORMAL
B19V IGG SER QL IA: 4.61 INDEX
B19V IGG+IGM SER-IMP: NORMAL
B19V IGG+IGM SER-IMP: POSITIVE
B19V IGM FLD-ACNC: 0.16 INDEX
B19V IGM SER-ACNC: NEGATIVE
BASOPHILS # BLD AUTO: 0.02 K/UL
BASOPHILS NFR BLD AUTO: 0.3 %
BLD GP AB SCN SERPL QL: NORMAL
EOSINOPHIL # BLD AUTO: 0.04 K/UL
EOSINOPHIL NFR BLD AUTO: 0.6 %
HBV SURFACE AG SER QL: NONREACTIVE
HCT VFR BLD CALC: 33.7 %
HCV AB SER QL: NONREACTIVE
HCV S/CO RATIO: 0.09 S/CO
HGB BLD-MCNC: 11 G/DL
HIV1+2 AB SPEC QL IA.RAPID: NONREACTIVE
IMM GRANULOCYTES NFR BLD AUTO: 0.3 %
LEAD BLD-MCNC: <1 UG/DL
LYMPHOCYTES # BLD AUTO: 1.33 K/UL
LYMPHOCYTES NFR BLD AUTO: 19.5 %
MAN DIFF?: NORMAL
MCHC RBC-ENTMCNC: 29.3 PG
MCHC RBC-ENTMCNC: 32.6 G/DL
MCV RBC AUTO: 89.9 FL
MEV IGG FLD QL IA: 91.2 AU/ML
MEV IGG+IGM SER-IMP: POSITIVE
MONOCYTES # BLD AUTO: 0.42 K/UL
MONOCYTES NFR BLD AUTO: 6.2 %
MUV AB SER-ACNC: POSITIVE
MUV IGG SER QL IA: 75.9 AU/ML
NEUTROPHILS # BLD AUTO: 4.99 K/UL
NEUTROPHILS NFR BLD AUTO: 73.1 %
PLATELET # BLD AUTO: 207 K/UL
RBC # BLD: 3.75 M/UL
RBC # FLD: 12.9 %
RUBV IGG FLD-ACNC: 2.26 INDEX
RUBV IGG SER-IMP: POSITIVE
T PALLIDUM AB SER QL IA: NEGATIVE
VZV AB TITR SER: POSITIVE
VZV IGG SER IF-ACNC: 5.38 S/CO
WBC # FLD AUTO: 6.82 K/UL

## 2025-01-24 RX ORDER — CHLORHEXIDINE GLUCONATE 4 %
325 (65 FE) LIQUID (ML) TOPICAL
Qty: 30 | Refills: 3 | Status: ACTIVE | COMMUNITY
Start: 2025-01-24 | End: 1900-01-01

## 2025-01-25 LAB — BACTERIA UR CULT: NORMAL

## 2025-03-12 ENCOUNTER — RESULT CHARGE (OUTPATIENT)
Age: 25
End: 2025-03-12

## 2025-03-12 ENCOUNTER — APPOINTMENT (OUTPATIENT)
Dept: OBGYN | Facility: CLINIC | Age: 25
End: 2025-03-12
Payer: MEDICAID

## 2025-03-12 VITALS
SYSTOLIC BLOOD PRESSURE: 104 MMHG | DIASTOLIC BLOOD PRESSURE: 68 MMHG | HEART RATE: 89 BPM | WEIGHT: 155 LBS | BODY MASS INDEX: 28.35 KG/M2

## 2025-03-12 LAB
BILIRUB UR QL STRIP: NORMAL
GLUCOSE UR-MCNC: NORMAL
HCG UR QL: 1 EU/DL
HGB UR QL STRIP.AUTO: NORMAL
KETONES UR-MCNC: NORMAL
LEUKOCYTE ESTERASE UR QL STRIP: NORMAL
NITRITE UR QL STRIP: NORMAL
PH UR STRIP: 8.5
PROT UR STRIP-MCNC: NORMAL
SP GR UR STRIP: 1.01

## 2025-03-12 PROCEDURE — 81003 URINALYSIS AUTO W/O SCOPE: CPT | Mod: QW

## 2025-03-12 PROCEDURE — 0502F SUBSEQUENT PRENATAL CARE: CPT

## 2025-04-01 ENCOUNTER — APPOINTMENT (OUTPATIENT)
Dept: OBGYN | Facility: CLINIC | Age: 25
End: 2025-04-01
Payer: MEDICAID

## 2025-04-01 ENCOUNTER — APPOINTMENT (OUTPATIENT)
Dept: ANTEPARTUM | Facility: CLINIC | Age: 25
End: 2025-04-01
Payer: MEDICAID

## 2025-04-01 ENCOUNTER — NON-APPOINTMENT (OUTPATIENT)
Age: 25
End: 2025-04-01

## 2025-04-01 ENCOUNTER — ASOB RESULT (OUTPATIENT)
Age: 25
End: 2025-04-01

## 2025-04-01 VITALS
HEART RATE: 87 BPM | SYSTOLIC BLOOD PRESSURE: 117 MMHG | BODY MASS INDEX: 28.53 KG/M2 | WEIGHT: 156 LBS | DIASTOLIC BLOOD PRESSURE: 70 MMHG

## 2025-04-01 PROCEDURE — 0502F SUBSEQUENT PRENATAL CARE: CPT

## 2025-04-01 PROCEDURE — 81003 URINALYSIS AUTO W/O SCOPE: CPT | Mod: QW

## 2025-04-01 PROCEDURE — 76811 OB US DETAILED SNGL FETUS: CPT

## 2025-04-28 ENCOUNTER — APPOINTMENT (OUTPATIENT)
Dept: OBGYN | Facility: CLINIC | Age: 25
End: 2025-04-28
Payer: MEDICAID

## 2025-04-28 VITALS
DIASTOLIC BLOOD PRESSURE: 66 MMHG | HEART RATE: 92 BPM | WEIGHT: 166 LBS | SYSTOLIC BLOOD PRESSURE: 100 MMHG | BODY MASS INDEX: 30.36 KG/M2

## 2025-04-28 DIAGNOSIS — Z34.90 ENCOUNTER FOR SUPERVISION OF NORMAL PREGNANCY, UNSPECIFIED, UNSPECIFIED TRIMESTER: ICD-10-CM

## 2025-04-28 LAB
BILIRUB UR QL STRIP: NORMAL
GLUCOSE UR-MCNC: NORMAL
HCG UR QL: 0.2 EU/DL
HGB UR QL STRIP.AUTO: NORMAL
KETONES UR-MCNC: NORMAL
LEUKOCYTE ESTERASE UR QL STRIP: NORMAL
NITRITE UR QL STRIP: NORMAL
PH UR STRIP: 7
PROT UR STRIP-MCNC: NORMAL
SP GR UR STRIP: 1.02

## 2025-04-28 PROCEDURE — 0502F SUBSEQUENT PRENATAL CARE: CPT

## 2025-04-28 PROCEDURE — 81003 URINALYSIS AUTO W/O SCOPE: CPT | Mod: QW

## 2025-04-29 LAB — GLUCOSE 1H P 50 G GLC PO SERPL-MCNC: 80 MG/DL

## 2025-05-01 LAB
BASOPHILS # BLD AUTO: 0.02 K/UL
BASOPHILS NFR BLD AUTO: 0.3 %
EOSINOPHIL # BLD AUTO: 0.03 K/UL
EOSINOPHIL NFR BLD AUTO: 0.4 %
HCT VFR BLD CALC: 31.6 %
HGB BLD-MCNC: 10.1 G/DL
IMM GRANULOCYTES NFR BLD AUTO: 1 %
LYMPHOCYTES # BLD AUTO: 1.59 K/UL
LYMPHOCYTES NFR BLD AUTO: 23.2 %
MAN DIFF?: NORMAL
MCHC RBC-ENTMCNC: 30 PG
MCHC RBC-ENTMCNC: 32 G/DL
MCV RBC AUTO: 93.8 FL
MONOCYTES # BLD AUTO: 0.47 K/UL
MONOCYTES NFR BLD AUTO: 6.9 %
NEUTROPHILS # BLD AUTO: 4.68 K/UL
NEUTROPHILS NFR BLD AUTO: 68.2 %
PLATELET # BLD AUTO: 186 K/UL
RBC # BLD: 3.37 M/UL
RBC # FLD: 14.2 %
WBC # FLD AUTO: 6.86 K/UL

## 2025-06-10 ENCOUNTER — APPOINTMENT (OUTPATIENT)
Dept: OBGYN | Facility: CLINIC | Age: 25
End: 2025-06-10
Payer: MEDICAID

## 2025-06-10 ENCOUNTER — RX RENEWAL (OUTPATIENT)
Age: 25
End: 2025-06-10

## 2025-06-10 VITALS
HEIGHT: 62 IN | HEART RATE: 99 BPM | BODY MASS INDEX: 30.73 KG/M2 | DIASTOLIC BLOOD PRESSURE: 62 MMHG | SYSTOLIC BLOOD PRESSURE: 96 MMHG | WEIGHT: 167 LBS

## 2025-06-10 PROCEDURE — 81003 URINALYSIS AUTO W/O SCOPE: CPT | Mod: QW

## 2025-06-10 PROCEDURE — 0502F SUBSEQUENT PRENATAL CARE: CPT

## 2025-06-10 RX ORDER — FERROUS SULFATE TAB 325 MG (65 MG ELEMENTAL FE) 325 (65 FE) MG
325 (65 FE) TAB ORAL
Qty: 30 | Refills: 3 | Status: ACTIVE | COMMUNITY
Start: 2025-06-10 | End: 1900-01-01

## 2025-07-03 ENCOUNTER — APPOINTMENT (OUTPATIENT)
Dept: OBGYN | Facility: CLINIC | Age: 25
End: 2025-07-03
Payer: MEDICAID

## 2025-07-03 VITALS
SYSTOLIC BLOOD PRESSURE: 104 MMHG | HEIGHT: 64 IN | DIASTOLIC BLOOD PRESSURE: 71 MMHG | HEART RATE: 94 BPM | WEIGHT: 168 LBS | BODY MASS INDEX: 28.68 KG/M2

## 2025-07-03 PROCEDURE — 81003 URINALYSIS AUTO W/O SCOPE: CPT | Mod: NC,QW

## 2025-07-03 PROCEDURE — 0502F SUBSEQUENT PRENATAL CARE: CPT

## 2025-07-06 LAB
BASOPHILS # BLD AUTO: 0.02 K/UL
BASOPHILS NFR BLD AUTO: 0.2 %
EOSINOPHIL # BLD AUTO: 0.05 K/UL
EOSINOPHIL NFR BLD AUTO: 0.6 %
HCT VFR BLD CALC: 36.1 %
HGB BLD-MCNC: 11.7 G/DL
HIV1+2 AB SPEC QL IA.RAPID: NONREACTIVE
IMM GRANULOCYTES NFR BLD AUTO: 0.2 %
LYMPHOCYTES # BLD AUTO: 1.7 K/UL
LYMPHOCYTES NFR BLD AUTO: 20.8 %
MAN DIFF?: NORMAL
MCHC RBC-ENTMCNC: 30.6 PG
MCHC RBC-ENTMCNC: 32.4 G/DL
MCV RBC AUTO: 94.5 FL
MONOCYTES # BLD AUTO: 0.49 K/UL
MONOCYTES NFR BLD AUTO: 6 %
NEUTROPHILS # BLD AUTO: 5.91 K/UL
NEUTROPHILS NFR BLD AUTO: 72.2 %
PLATELET # BLD AUTO: 179 K/UL
RBC # BLD: 3.82 M/UL
RBC # FLD: 14.2 %
T PALLIDUM AB SER QL IA: NEGATIVE
WBC # FLD AUTO: 8.19 K/UL

## 2025-07-09 LAB — B-HEM STREP SPEC QL CULT: NORMAL

## 2025-07-14 ENCOUNTER — APPOINTMENT (OUTPATIENT)
Dept: OBGYN | Facility: CLINIC | Age: 25
End: 2025-07-14
Payer: MEDICAID

## 2025-07-14 VITALS
BODY MASS INDEX: 29.02 KG/M2 | HEIGHT: 64 IN | WEIGHT: 170 LBS | HEART RATE: 96 BPM | SYSTOLIC BLOOD PRESSURE: 111 MMHG | DIASTOLIC BLOOD PRESSURE: 73 MMHG

## 2025-07-14 PROCEDURE — 81003 URINALYSIS AUTO W/O SCOPE: CPT | Mod: QW

## 2025-07-14 PROCEDURE — 0502F SUBSEQUENT PRENATAL CARE: CPT

## 2025-07-14 RX ORDER — LANSOPRAZOLE 30 MG/1
30 CAPSULE, DELAYED RELEASE ORAL DAILY
Qty: 30 | Refills: 2 | Status: ACTIVE | COMMUNITY
Start: 2025-07-14 | End: 1900-01-01

## 2025-07-21 ENCOUNTER — APPOINTMENT (OUTPATIENT)
Dept: OBGYN | Facility: CLINIC | Age: 25
End: 2025-07-21
Payer: MEDICAID

## 2025-07-21 VITALS
HEART RATE: 89 BPM | BODY MASS INDEX: 29.01 KG/M2 | SYSTOLIC BLOOD PRESSURE: 104 MMHG | DIASTOLIC BLOOD PRESSURE: 69 MMHG | WEIGHT: 169 LBS

## 2025-07-21 DIAGNOSIS — Z34.90 ENCOUNTER FOR SUPERVISION OF NORMAL PREGNANCY, UNSPECIFIED, UNSPECIFIED TRIMESTER: ICD-10-CM

## 2025-07-21 PROCEDURE — 81003 URINALYSIS AUTO W/O SCOPE: CPT | Mod: QW

## 2025-07-21 PROCEDURE — 0502F SUBSEQUENT PRENATAL CARE: CPT

## 2025-07-28 ENCOUNTER — APPOINTMENT (OUTPATIENT)
Dept: OBGYN | Facility: CLINIC | Age: 25
End: 2025-07-28
Payer: MEDICAID

## 2025-07-28 VITALS
HEIGHT: 64 IN | DIASTOLIC BLOOD PRESSURE: 74 MMHG | WEIGHT: 170 LBS | BODY MASS INDEX: 29.02 KG/M2 | SYSTOLIC BLOOD PRESSURE: 109 MMHG | HEART RATE: 98 BPM

## 2025-07-28 LAB
BILIRUB UR QL STRIP: NORMAL
GLUCOSE UR-MCNC: NORMAL
HCG UR QL: 0.2 EU/DL
HGB UR QL STRIP.AUTO: NORMAL
KETONES UR-MCNC: NORMAL
LEUKOCYTE ESTERASE UR QL STRIP: NORMAL
NITRITE UR QL STRIP: NORMAL
PH UR STRIP: 7.5
PROT UR STRIP-MCNC: NORMAL
SP GR UR STRIP: 1.02

## 2025-07-28 PROCEDURE — 76816 OB US FOLLOW-UP PER FETUS: CPT

## 2025-07-28 PROCEDURE — 81003 URINALYSIS AUTO W/O SCOPE: CPT | Mod: QW

## 2025-07-28 PROCEDURE — 76819 FETAL BIOPHYS PROFIL W/O NST: CPT | Mod: 59

## 2025-07-28 PROCEDURE — 0502F SUBSEQUENT PRENATAL CARE: CPT

## 2025-07-29 ENCOUNTER — INPATIENT (INPATIENT)
Facility: HOSPITAL | Age: 25
LOS: 0 days | Discharge: HOME | DRG: 566 | End: 2025-07-30
Attending: STUDENT IN AN ORGANIZED HEALTH CARE EDUCATION/TRAINING PROGRAM | Admitting: STUDENT IN AN ORGANIZED HEALTH CARE EDUCATION/TRAINING PROGRAM
Payer: MEDICAID

## 2025-07-29 VITALS
SYSTOLIC BLOOD PRESSURE: 132 MMHG | HEIGHT: 62 IN | HEART RATE: 85 BPM | DIASTOLIC BLOOD PRESSURE: 72 MMHG | RESPIRATION RATE: 18 BRPM | WEIGHT: 169.76 LBS

## 2025-07-29 DIAGNOSIS — O26.899 OTHER SPECIFIED PREGNANCY RELATED CONDITIONS, UNSPECIFIED TRIMESTER: ICD-10-CM

## 2025-07-29 DIAGNOSIS — O26.893 OTHER SPECIFIED PREGNANCY RELATED CONDITIONS, THIRD TRIMESTER: ICD-10-CM

## 2025-07-29 LAB
APPEARANCE UR: CLEAR — SIGNIFICANT CHANGE UP
BASOPHILS # BLD AUTO: 0.03 K/UL — SIGNIFICANT CHANGE UP (ref 0–0.2)
BASOPHILS NFR BLD AUTO: 0.3 % — SIGNIFICANT CHANGE UP (ref 0–1)
BILIRUB UR-MCNC: NEGATIVE — SIGNIFICANT CHANGE UP
COLOR SPEC: YELLOW — SIGNIFICANT CHANGE UP
DIFF PNL FLD: ABNORMAL
EOSINOPHIL # BLD AUTO: 0.04 K/UL — SIGNIFICANT CHANGE UP (ref 0–0.7)
EOSINOPHIL NFR BLD AUTO: 0.4 % — SIGNIFICANT CHANGE UP (ref 0–8)
GLUCOSE UR QL: NEGATIVE MG/DL — SIGNIFICANT CHANGE UP
HCT VFR BLD CALC: 37 % — SIGNIFICANT CHANGE UP (ref 37–47)
HGB BLD-MCNC: 12.5 G/DL — SIGNIFICANT CHANGE UP (ref 12–16)
IMM GRANULOCYTES NFR BLD AUTO: 0.7 % — HIGH (ref 0.1–0.3)
KETONES UR QL: NEGATIVE MG/DL — SIGNIFICANT CHANGE UP
L&D DRUG SCREEN, URINE: SIGNIFICANT CHANGE UP
LEUKOCYTE ESTERASE UR-ACNC: ABNORMAL
LYMPHOCYTES # BLD AUTO: 1.84 K/UL — SIGNIFICANT CHANGE UP (ref 1.2–3.4)
LYMPHOCYTES # BLD AUTO: 18.3 % — LOW (ref 20.5–51.1)
MCHC RBC-ENTMCNC: 30.6 PG — SIGNIFICANT CHANGE UP (ref 27–31)
MCHC RBC-ENTMCNC: 33.8 G/DL — SIGNIFICANT CHANGE UP (ref 32–37)
MCV RBC AUTO: 90.5 FL — SIGNIFICANT CHANGE UP (ref 81–99)
MONOCYTES # BLD AUTO: 0.49 K/UL — SIGNIFICANT CHANGE UP (ref 0.1–0.6)
MONOCYTES NFR BLD AUTO: 4.9 % — SIGNIFICANT CHANGE UP (ref 1.7–9.3)
NEUTROPHILS # BLD AUTO: 7.6 K/UL — HIGH (ref 1.4–6.5)
NEUTROPHILS NFR BLD AUTO: 75.4 % — HIGH (ref 42.2–75.2)
NITRITE UR-MCNC: NEGATIVE — SIGNIFICANT CHANGE UP
NRBC BLD AUTO-RTO: 0 /100 WBCS — SIGNIFICANT CHANGE UP (ref 0–0)
PH UR: 7 — SIGNIFICANT CHANGE UP (ref 5–8)
PLATELET # BLD AUTO: 161 K/UL — SIGNIFICANT CHANGE UP (ref 130–400)
PMV BLD: 11.2 FL — HIGH (ref 7.4–10.4)
PRENATAL SYPHILIS TEST: SIGNIFICANT CHANGE UP
PROT UR-MCNC: NEGATIVE MG/DL — SIGNIFICANT CHANGE UP
RBC # BLD: 4.09 M/UL — LOW (ref 4.2–5.4)
RBC # FLD: 13.3 % — SIGNIFICANT CHANGE UP (ref 11.5–14.5)
SP GR SPEC: 1.01 — SIGNIFICANT CHANGE UP (ref 1–1.03)
UROBILINOGEN FLD QL: 1 MG/DL — SIGNIFICANT CHANGE UP (ref 0.2–1)
WBC # BLD: 10.07 K/UL — SIGNIFICANT CHANGE UP (ref 4.8–10.8)
WBC # FLD AUTO: 10.07 K/UL — SIGNIFICANT CHANGE UP (ref 4.8–10.8)

## 2025-07-29 PROCEDURE — 36415 COLL VENOUS BLD VENIPUNCTURE: CPT

## 2025-07-29 PROCEDURE — 80354 DRUG SCREENING FENTANYL: CPT

## 2025-07-29 PROCEDURE — 86902 BLOOD TYPE ANTIGEN DONOR EA: CPT

## 2025-07-29 PROCEDURE — 86922 COMPATIBILITY TEST ANTIGLOB: CPT

## 2025-07-29 PROCEDURE — 86900 BLOOD TYPING SEROLOGIC ABO: CPT

## 2025-07-29 PROCEDURE — 59050 FETAL MONITOR W/REPORT: CPT

## 2025-07-29 PROCEDURE — 59400 OBSTETRICAL CARE: CPT | Mod: U9

## 2025-07-29 PROCEDURE — 81001 URINALYSIS AUTO W/SCOPE: CPT

## 2025-07-29 PROCEDURE — 80307 DRUG TEST PRSMV CHEM ANLYZR: CPT

## 2025-07-29 PROCEDURE — 85025 COMPLETE CBC W/AUTO DIFF WBC: CPT

## 2025-07-29 PROCEDURE — 86850 RBC ANTIBODY SCREEN: CPT

## 2025-07-29 PROCEDURE — 86901 BLOOD TYPING SEROLOGIC RH(D): CPT

## 2025-07-29 PROCEDURE — 86592 SYPHILIS TEST NON-TREP QUAL: CPT

## 2025-07-29 RX ORDER — SODIUM CHLORIDE 9 G/1000ML
1000 INJECTION, SOLUTION INTRAVENOUS
Refills: 0 | Status: DISCONTINUED | OUTPATIENT
Start: 2025-07-29 | End: 2025-07-29

## 2025-07-29 RX ORDER — BENZOCAINE 220 MG/G
1 SPRAY, METERED PERIODONTAL EVERY 6 HOURS
Refills: 0 | Status: DISCONTINUED | OUTPATIENT
Start: 2025-07-29 | End: 2025-07-30

## 2025-07-29 RX ORDER — ACETAMINOPHEN 500 MG/5ML
975 LIQUID (ML) ORAL
Refills: 0 | Status: DISCONTINUED | OUTPATIENT
Start: 2025-07-29 | End: 2025-07-30

## 2025-07-29 RX ORDER — MODIFIED LANOLIN 100 %
1 CREAM (GRAM) TOPICAL EVERY 6 HOURS
Refills: 0 | Status: DISCONTINUED | OUTPATIENT
Start: 2025-07-29 | End: 2025-07-30

## 2025-07-29 RX ORDER — TRANEXAMIC ACID 1000 MG/10
1000 AMPUL (ML) INTRAVENOUS ONCE
Refills: 0 | Status: DISCONTINUED | OUTPATIENT
Start: 2025-07-29 | End: 2025-07-30

## 2025-07-29 RX ORDER — PRENATAL 136/IRON/FOLIC ACID 27 MG-1 MG
1 TABLET ORAL DAILY
Refills: 0 | Status: DISCONTINUED | OUTPATIENT
Start: 2025-07-29 | End: 2025-07-30

## 2025-07-29 RX ORDER — OXYCODONE HYDROCHLORIDE 30 MG/1
5 TABLET ORAL
Refills: 0 | Status: DISCONTINUED | OUTPATIENT
Start: 2025-07-29 | End: 2025-07-30

## 2025-07-29 RX ORDER — SIMETHICONE 80 MG
80 TABLET,CHEWABLE ORAL EVERY 4 HOURS
Refills: 0 | Status: DISCONTINUED | OUTPATIENT
Start: 2025-07-29 | End: 2025-07-30

## 2025-07-29 RX ORDER — IBUPROFEN 200 MG
600 TABLET ORAL EVERY 6 HOURS
Refills: 0 | Status: COMPLETED | OUTPATIENT
Start: 2025-07-29 | End: 2026-06-27

## 2025-07-29 RX ORDER — WITCH HAZEL LEAF
1 FLUID EXTRACT MISCELLANEOUS EVERY 4 HOURS
Refills: 0 | Status: DISCONTINUED | OUTPATIENT
Start: 2025-07-29 | End: 2025-07-30

## 2025-07-29 RX ORDER — IBUPROFEN 200 MG
600 TABLET ORAL EVERY 6 HOURS
Refills: 0 | Status: DISCONTINUED | OUTPATIENT
Start: 2025-07-29 | End: 2025-07-30

## 2025-07-29 RX ORDER — KETOROLAC TROMETHAMINE 30 MG/ML
30 INJECTION, SOLUTION INTRAMUSCULAR; INTRAVENOUS ONCE
Refills: 0 | Status: DISCONTINUED | OUTPATIENT
Start: 2025-07-29 | End: 2025-07-29

## 2025-07-29 RX ORDER — OXYCODONE HYDROCHLORIDE 30 MG/1
5 TABLET ORAL ONCE
Refills: 0 | Status: DISCONTINUED | OUTPATIENT
Start: 2025-07-29 | End: 2025-07-30

## 2025-07-29 RX ORDER — DIBUCAINE 10 MG/G
1 OINTMENT TOPICAL EVERY 6 HOURS
Refills: 0 | Status: DISCONTINUED | OUTPATIENT
Start: 2025-07-29 | End: 2025-07-30

## 2025-07-29 RX ORDER — OXYTOCIN-SODIUM CHLORIDE 0.9% IV SOLN 30 UNIT/500ML 30-0.9/5 UT/ML-%
167 SOLUTION INTRAVENOUS
Qty: 30 | Refills: 0 | Status: DISCONTINUED | OUTPATIENT
Start: 2025-07-29 | End: 2025-07-30

## 2025-07-29 RX ORDER — DIPHENHYDRAMINE HCL 12.5MG/5ML
25 ELIXIR ORAL EVERY 6 HOURS
Refills: 0 | Status: DISCONTINUED | OUTPATIENT
Start: 2025-07-29 | End: 2025-07-30

## 2025-07-29 RX ORDER — OXYTOCIN-SODIUM CHLORIDE 0.9% IV SOLN 30 UNIT/500ML 30-0.9/5 UT/ML-%
167 SOLUTION INTRAVENOUS
Qty: 30 | Refills: 0 | Status: DISCONTINUED | OUTPATIENT
Start: 2025-07-29 | End: 2025-07-29

## 2025-07-29 RX ORDER — MAGNESIUM HYDROXIDE 400 MG/5ML
30 SUSPENSION ORAL
Refills: 0 | Status: DISCONTINUED | OUTPATIENT
Start: 2025-07-29 | End: 2025-07-30

## 2025-07-29 RX ORDER — CARBOPROST TROMETHAMINE 250 UG/ML
250 INJECTION, SOLUTION INTRAMUSCULAR ONCE
Refills: 0 | Status: DISCONTINUED | OUTPATIENT
Start: 2025-07-29 | End: 2025-07-30

## 2025-07-29 RX ADMIN — Medication 975 MILLIGRAM(S): at 21:02

## 2025-07-29 RX ADMIN — Medication 975 MILLIGRAM(S): at 14:28

## 2025-07-29 RX ADMIN — Medication 975 MILLIGRAM(S): at 09:10

## 2025-07-29 RX ADMIN — MAGNESIUM HYDROXIDE 30 MILLILITER(S): 400 SUSPENSION ORAL at 17:04

## 2025-07-29 RX ADMIN — Medication 600 MILLIGRAM(S): at 17:04

## 2025-07-29 RX ADMIN — Medication 600 MILLIGRAM(S): at 23:47

## 2025-07-29 RX ADMIN — Medication 975 MILLIGRAM(S): at 09:40

## 2025-07-29 RX ADMIN — KETOROLAC TROMETHAMINE 30 MILLIGRAM(S): 30 INJECTION, SOLUTION INTRAMUSCULAR; INTRAVENOUS at 06:50

## 2025-07-29 RX ADMIN — Medication 975 MILLIGRAM(S): at 14:58

## 2025-07-29 RX ADMIN — Medication 3 MILLILITER(S): at 14:32

## 2025-07-29 RX ADMIN — Medication 600 MILLIGRAM(S): at 23:19

## 2025-07-29 RX ADMIN — Medication 975 MILLIGRAM(S): at 20:29

## 2025-07-29 RX ADMIN — OXYTOCIN-SODIUM CHLORIDE 0.9% IV SOLN 30 UNIT/500ML 167 MILLIUNIT(S)/MIN: 30-0.9/5 SOLUTION at 06:39

## 2025-07-30 ENCOUNTER — TRANSCRIPTION ENCOUNTER (OUTPATIENT)
Age: 25
End: 2025-07-30

## 2025-07-30 ENCOUNTER — APPOINTMENT (OUTPATIENT)
Dept: OBGYN | Facility: CLINIC | Age: 25
End: 2025-07-30

## 2025-07-30 VITALS
TEMPERATURE: 97 F | DIASTOLIC BLOOD PRESSURE: 68 MMHG | HEART RATE: 73 BPM | SYSTOLIC BLOOD PRESSURE: 109 MMHG | RESPIRATION RATE: 18 BRPM | OXYGEN SATURATION: 96 %

## 2025-07-30 LAB
BASOPHILS # BLD AUTO: 0.04 K/UL — SIGNIFICANT CHANGE UP (ref 0–0.2)
BASOPHILS NFR BLD AUTO: 0.5 % — SIGNIFICANT CHANGE UP (ref 0–1)
EOSINOPHIL # BLD AUTO: 0.07 K/UL — SIGNIFICANT CHANGE UP (ref 0–0.7)
EOSINOPHIL NFR BLD AUTO: 0.8 % — SIGNIFICANT CHANGE UP (ref 0–8)
HCT VFR BLD CALC: 34.3 % — LOW (ref 37–47)
HGB BLD-MCNC: 11.5 G/DL — LOW (ref 12–16)
IMM GRANULOCYTES NFR BLD AUTO: 0.7 % — HIGH (ref 0.1–0.3)
LYMPHOCYTES # BLD AUTO: 2.01 K/UL — SIGNIFICANT CHANGE UP (ref 1.2–3.4)
LYMPHOCYTES # BLD AUTO: 22.7 % — SIGNIFICANT CHANGE UP (ref 20.5–51.1)
MCHC RBC-ENTMCNC: 30.5 PG — SIGNIFICANT CHANGE UP (ref 27–31)
MCHC RBC-ENTMCNC: 33.5 G/DL — SIGNIFICANT CHANGE UP (ref 32–37)
MCV RBC AUTO: 91 FL — SIGNIFICANT CHANGE UP (ref 81–99)
MONOCYTES # BLD AUTO: 0.45 K/UL — SIGNIFICANT CHANGE UP (ref 0.1–0.6)
MONOCYTES NFR BLD AUTO: 5.1 % — SIGNIFICANT CHANGE UP (ref 1.7–9.3)
NEUTROPHILS # BLD AUTO: 6.22 K/UL — SIGNIFICANT CHANGE UP (ref 1.4–6.5)
NEUTROPHILS NFR BLD AUTO: 70.2 % — SIGNIFICANT CHANGE UP (ref 42.2–75.2)
NRBC BLD AUTO-RTO: 0 /100 WBCS — SIGNIFICANT CHANGE UP (ref 0–0)
PLATELET # BLD AUTO: 162 K/UL — SIGNIFICANT CHANGE UP (ref 130–400)
PMV BLD: 11.5 FL — HIGH (ref 7.4–10.4)
RBC # BLD: 3.77 M/UL — LOW (ref 4.2–5.4)
RBC # FLD: 13.3 % — SIGNIFICANT CHANGE UP (ref 11.5–14.5)
WBC # BLD: 8.85 K/UL — SIGNIFICANT CHANGE UP (ref 4.8–10.8)
WBC # FLD AUTO: 8.85 K/UL — SIGNIFICANT CHANGE UP (ref 4.8–10.8)

## 2025-07-30 RX ORDER — WITCH HAZEL LEAF
1 FLUID EXTRACT MISCELLANEOUS
Qty: 0 | Refills: 0 | DISCHARGE
Start: 2025-07-30

## 2025-07-30 RX ADMIN — Medication 600 MILLIGRAM(S): at 06:10

## 2025-07-30 RX ADMIN — Medication 600 MILLIGRAM(S): at 07:00

## 2025-08-04 DIAGNOSIS — Z3A.40 40 WEEKS GESTATION OF PREGNANCY: ICD-10-CM

## 2025-08-04 DIAGNOSIS — Z28.09 IMMUNIZATION NOT CARRIED OUT BECAUSE OF OTHER CONTRAINDICATION: ICD-10-CM

## 2025-08-04 DIAGNOSIS — O43.193 OTHER MALFORMATION OF PLACENTA, THIRD TRIMESTER: ICD-10-CM

## 2025-09-02 ENCOUNTER — APPOINTMENT (OUTPATIENT)
Dept: OBGYN | Facility: CLINIC | Age: 25
End: 2025-09-02

## 2025-09-15 ENCOUNTER — APPOINTMENT (OUTPATIENT)
Dept: OBGYN | Facility: CLINIC | Age: 25
End: 2025-09-15
Payer: MEDICAID

## 2025-09-15 ENCOUNTER — NON-APPOINTMENT (OUTPATIENT)
Age: 25
End: 2025-09-15

## 2025-09-15 VITALS
WEIGHT: 148 LBS | HEART RATE: 84 BPM | DIASTOLIC BLOOD PRESSURE: 70 MMHG | SYSTOLIC BLOOD PRESSURE: 103 MMHG | BODY MASS INDEX: 25.4 KG/M2

## 2025-09-15 PROCEDURE — G0444 DEPRESSION SCREEN ANNUAL: CPT

## 2025-09-15 PROCEDURE — 0503F POSTPARTUM CARE VISIT: CPT

## 2025-09-22 LAB
C TRACH RRNA SPEC QL NAA+PROBE: NOT DETECTED
CYTOLOGY CVX/VAG DOC THIN PREP: NORMAL
N GONORRHOEA RRNA SPEC QL NAA+PROBE: NOT DETECTED
SOURCE TP AMPLIFICATION: NORMAL